# Patient Record
Sex: MALE | Race: WHITE | URBAN - METROPOLITAN AREA
[De-identification: names, ages, dates, MRNs, and addresses within clinical notes are randomized per-mention and may not be internally consistent; named-entity substitution may affect disease eponyms.]

---

## 2017-02-27 DIAGNOSIS — I10 ESSENTIAL HYPERTENSION: ICD-10-CM

## 2017-02-27 DIAGNOSIS — N02.8 IGA NEPHROPATHY: ICD-10-CM

## 2017-03-01 RX ORDER — LISINOPRIL 40 MG/1
TABLET ORAL
Qty: 90 TABLET | Refills: 0 | Status: SHIPPED | OUTPATIENT
Start: 2017-03-01 | End: 2017-05-15 | Stop reason: SDUPTHER

## 2017-03-24 ENCOUNTER — TELEPHONE (OUTPATIENT)
Dept: FAMILY MEDICINE CLINIC | Age: 52
End: 2017-03-24

## 2017-03-24 DIAGNOSIS — N02.8 IGA NEPHROPATHY: ICD-10-CM

## 2017-03-24 DIAGNOSIS — E78.00 HYPERCHOLESTEROLEMIA: ICD-10-CM

## 2017-03-24 DIAGNOSIS — I10 ESSENTIAL HYPERTENSION: ICD-10-CM

## 2017-03-24 DIAGNOSIS — R73.9 HYPERGLYCEMIA: Primary | ICD-10-CM

## 2017-04-21 ENCOUNTER — OFFICE VISIT (OUTPATIENT)
Dept: FAMILY MEDICINE CLINIC | Age: 52
End: 2017-04-21

## 2017-04-21 VITALS
SYSTOLIC BLOOD PRESSURE: 133 MMHG | TEMPERATURE: 98.3 F | HEART RATE: 57 BPM | BODY MASS INDEX: 34.04 KG/M2 | DIASTOLIC BLOOD PRESSURE: 83 MMHG | WEIGHT: 251 LBS | RESPIRATION RATE: 16 BRPM

## 2017-04-21 DIAGNOSIS — Z12.11 SCREEN FOR COLON CANCER: ICD-10-CM

## 2017-04-21 DIAGNOSIS — E78.00 HYPERCHOLESTEROLEMIA: ICD-10-CM

## 2017-04-21 DIAGNOSIS — L60.9 NAIL ABNORMALITIES: ICD-10-CM

## 2017-04-21 DIAGNOSIS — I10 ESSENTIAL HYPERTENSION: ICD-10-CM

## 2017-04-21 DIAGNOSIS — N02.8 IGA NEPHROPATHY: ICD-10-CM

## 2017-04-21 DIAGNOSIS — R73.9 HYPERGLYCEMIA: ICD-10-CM

## 2017-04-21 DIAGNOSIS — D22.9 MULTIPLE NEVI: ICD-10-CM

## 2017-04-21 DIAGNOSIS — Z00.00 ROUTINE GENERAL MEDICAL EXAMINATION AT A HEALTH CARE FACILITY: Primary | ICD-10-CM

## 2017-04-21 LAB
FOLATE: 15.3 NG/ML (ref 4.78–24.2)
T4 FREE: 1.3 NG/DL (ref 0.9–1.8)
TSH SERPL DL<=0.05 MIU/L-ACNC: 1.81 UIU/ML (ref 0.27–4.2)
VITAMIN B-12: 298 PG/ML (ref 211–911)
VITAMIN D 25-HYDROXY: 21.6 NG/ML

## 2017-04-21 PROCEDURE — 99396 PREV VISIT EST AGE 40-64: CPT | Performed by: FAMILY MEDICINE

## 2017-04-21 RX ORDER — CLOTRIMAZOLE AND BETAMETHASONE DIPROPIONATE 10; .64 MG/G; MG/G
CREAM TOPICAL
Qty: 45 G | Refills: 1 | Status: SHIPPED | OUTPATIENT
Start: 2017-04-21 | End: 2021-10-07

## 2017-05-12 ENCOUNTER — OFFICE VISIT (OUTPATIENT)
Dept: ORTHOPEDIC SURGERY | Age: 52
End: 2017-05-12

## 2017-05-12 ENCOUNTER — TELEPHONE (OUTPATIENT)
Dept: ORTHOPEDIC SURGERY | Age: 52
End: 2017-05-12

## 2017-05-12 VITALS
DIASTOLIC BLOOD PRESSURE: 82 MMHG | HEIGHT: 72 IN | BODY MASS INDEX: 34 KG/M2 | SYSTOLIC BLOOD PRESSURE: 127 MMHG | RESPIRATION RATE: 16 BRPM | HEART RATE: 53 BPM | WEIGHT: 251 LBS

## 2017-05-12 DIAGNOSIS — R20.0 HAND NUMBNESS: Primary | ICD-10-CM

## 2017-05-12 PROCEDURE — 99243 OFF/OP CNSLTJ NEW/EST LOW 30: CPT | Performed by: ORTHOPAEDIC SURGERY

## 2017-05-15 DIAGNOSIS — N02.8 IGA NEPHROPATHY: ICD-10-CM

## 2017-05-15 DIAGNOSIS — I10 ESSENTIAL HYPERTENSION: ICD-10-CM

## 2017-05-17 RX ORDER — LISINOPRIL 40 MG/1
TABLET ORAL
Qty: 90 TABLET | Refills: 1 | Status: SHIPPED | OUTPATIENT
Start: 2017-05-17 | End: 2017-12-08 | Stop reason: SDUPTHER

## 2017-08-16 ENCOUNTER — TELEPHONE (OUTPATIENT)
Dept: DERMATOLOGY | Age: 52
End: 2017-08-16

## 2017-08-16 ENCOUNTER — OFFICE VISIT (OUTPATIENT)
Dept: DERMATOLOGY | Age: 52
End: 2017-08-16

## 2017-08-16 DIAGNOSIS — B35.1 TINEA MANUUM, PEDIS, AND UNGUIUM: ICD-10-CM

## 2017-08-16 DIAGNOSIS — D22.9 MULTIPLE NEVI: Primary | ICD-10-CM

## 2017-08-16 DIAGNOSIS — B35.2 TINEA MANUUM, PEDIS, AND UNGUIUM: ICD-10-CM

## 2017-08-16 DIAGNOSIS — B35.3 TINEA MANUUM, PEDIS, AND UNGUIUM: ICD-10-CM

## 2017-08-16 PROCEDURE — 99202 OFFICE O/P NEW SF 15 MIN: CPT | Performed by: DERMATOLOGY

## 2017-08-16 RX ORDER — TERBINAFINE HYDROCHLORIDE 250 MG/1
250 TABLET ORAL DAILY
Qty: 42 TABLET | Refills: 0 | Status: SHIPPED | OUTPATIENT
Start: 2017-08-16 | End: 2017-09-27

## 2017-12-08 DIAGNOSIS — I10 ESSENTIAL HYPERTENSION: ICD-10-CM

## 2017-12-08 DIAGNOSIS — N02.8 IGA NEPHROPATHY: ICD-10-CM

## 2017-12-08 RX ORDER — LISINOPRIL 40 MG/1
TABLET ORAL
Qty: 90 TABLET | Refills: 0 | Status: SHIPPED | OUTPATIENT
Start: 2017-12-08 | End: 2018-01-18 | Stop reason: SDUPTHER

## 2018-01-04 ENCOUNTER — OFFICE VISIT (OUTPATIENT)
Dept: DERMATOLOGY | Age: 53
End: 2018-01-04

## 2018-01-04 DIAGNOSIS — B35.1 ONYCHOMYCOSIS: Primary | ICD-10-CM

## 2018-01-04 DIAGNOSIS — B35.2 TINEA MANUUM: ICD-10-CM

## 2018-01-04 PROCEDURE — 99212 OFFICE O/P EST SF 10 MIN: CPT | Performed by: DERMATOLOGY

## 2018-01-18 ENCOUNTER — OFFICE VISIT (OUTPATIENT)
Dept: FAMILY MEDICINE CLINIC | Age: 53
End: 2018-01-18

## 2018-01-18 VITALS
BODY MASS INDEX: 34.72 KG/M2 | OXYGEN SATURATION: 97 % | SYSTOLIC BLOOD PRESSURE: 128 MMHG | WEIGHT: 256 LBS | HEART RATE: 67 BPM | DIASTOLIC BLOOD PRESSURE: 80 MMHG

## 2018-01-18 DIAGNOSIS — Z00.00 ROUTINE GENERAL MEDICAL EXAMINATION AT A HEALTH CARE FACILITY: ICD-10-CM

## 2018-01-18 DIAGNOSIS — R73.9 HYPERGLYCEMIA: ICD-10-CM

## 2018-01-18 DIAGNOSIS — I10 ESSENTIAL HYPERTENSION: ICD-10-CM

## 2018-01-18 DIAGNOSIS — R07.89 CHEST PRESSURE: ICD-10-CM

## 2018-01-18 DIAGNOSIS — N02.8 IGA NEPHROPATHY: Primary | ICD-10-CM

## 2018-01-18 DIAGNOSIS — F43.9 STRESS: ICD-10-CM

## 2018-01-18 DIAGNOSIS — R15.9 INCONTINENCE OF FECES, UNSPECIFIED FECAL INCONTINENCE TYPE: ICD-10-CM

## 2018-01-18 DIAGNOSIS — Z12.11 SCREEN FOR COLON CANCER: ICD-10-CM

## 2018-01-18 DIAGNOSIS — E78.00 HYPERCHOLESTEROLEMIA: ICD-10-CM

## 2018-01-18 PROCEDURE — 93000 ELECTROCARDIOGRAM COMPLETE: CPT | Performed by: FAMILY MEDICINE

## 2018-01-18 PROCEDURE — 99214 OFFICE O/P EST MOD 30 MIN: CPT | Performed by: FAMILY MEDICINE

## 2018-01-18 RX ORDER — LISINOPRIL 40 MG/1
TABLET ORAL
Qty: 90 TABLET | Refills: 1 | Status: SHIPPED | OUTPATIENT
Start: 2018-01-18 | End: 2018-09-30 | Stop reason: SDUPTHER

## 2018-09-30 DIAGNOSIS — I10 ESSENTIAL HYPERTENSION: ICD-10-CM

## 2018-09-30 DIAGNOSIS — N02.8 IGA NEPHROPATHY: ICD-10-CM

## 2018-10-01 RX ORDER — LISINOPRIL 40 MG/1
TABLET ORAL
Qty: 90 TABLET | Refills: 0 | Status: SHIPPED | OUTPATIENT
Start: 2018-10-01 | End: 2019-01-08 | Stop reason: SDUPTHER

## 2019-01-08 DIAGNOSIS — N02.8 IGA NEPHROPATHY: ICD-10-CM

## 2019-01-08 DIAGNOSIS — I10 ESSENTIAL HYPERTENSION: ICD-10-CM

## 2019-01-08 RX ORDER — LISINOPRIL 40 MG/1
TABLET ORAL
Qty: 30 TABLET | Refills: 0 | Status: SHIPPED | OUTPATIENT
Start: 2019-01-08

## 2021-08-03 ENCOUNTER — OFFICE VISIT (OUTPATIENT)
Dept: FAMILY MEDICINE CLINIC | Age: 56
End: 2021-08-03
Payer: COMMERCIAL

## 2021-08-03 VITALS
WEIGHT: 253 LBS | SYSTOLIC BLOOD PRESSURE: 148 MMHG | OXYGEN SATURATION: 98 % | BODY MASS INDEX: 34.31 KG/M2 | HEART RATE: 63 BPM | TEMPERATURE: 97.7 F | RESPIRATION RATE: 20 BRPM | DIASTOLIC BLOOD PRESSURE: 96 MMHG

## 2021-08-03 DIAGNOSIS — E55.9 VITAMIN D DEFICIENCY: ICD-10-CM

## 2021-08-03 DIAGNOSIS — Z23 NEED FOR SHINGLES VACCINE: ICD-10-CM

## 2021-08-03 DIAGNOSIS — E78.00 HYPERCHOLESTEROLEMIA: ICD-10-CM

## 2021-08-03 DIAGNOSIS — I10 ESSENTIAL HYPERTENSION: ICD-10-CM

## 2021-08-03 DIAGNOSIS — Z23 NEED FOR DIPHTHERIA-TETANUS-PERTUSSIS (TDAP) VACCINE: ICD-10-CM

## 2021-08-03 DIAGNOSIS — Z12.2 ENCOUNTER FOR SCREENING FOR LUNG CANCER: ICD-10-CM

## 2021-08-03 DIAGNOSIS — N02.8 IGA NEPHROPATHY: ICD-10-CM

## 2021-08-03 DIAGNOSIS — J34.89 NASAL VESTIBULITIS: ICD-10-CM

## 2021-08-03 DIAGNOSIS — R73.9 HYPERGLYCEMIA: Primary | ICD-10-CM

## 2021-08-03 DIAGNOSIS — R10.84 GENERALIZED ABDOMINAL PAIN: ICD-10-CM

## 2021-08-03 DIAGNOSIS — Z12.11 SCREEN FOR COLON CANCER: ICD-10-CM

## 2021-08-03 DIAGNOSIS — Z12.5 SCREENING FOR PROSTATE CANCER: ICD-10-CM

## 2021-08-03 PROCEDURE — 90472 IMMUNIZATION ADMIN EACH ADD: CPT | Performed by: FAMILY MEDICINE

## 2021-08-03 PROCEDURE — 99214 OFFICE O/P EST MOD 30 MIN: CPT | Performed by: FAMILY MEDICINE

## 2021-08-03 PROCEDURE — 90471 IMMUNIZATION ADMIN: CPT | Performed by: FAMILY MEDICINE

## 2021-08-03 PROCEDURE — 90750 HZV VACC RECOMBINANT IM: CPT | Performed by: FAMILY MEDICINE

## 2021-08-03 PROCEDURE — 90715 TDAP VACCINE 7 YRS/> IM: CPT | Performed by: FAMILY MEDICINE

## 2021-08-03 RX ORDER — LISINOPRIL 40 MG/1
40 TABLET ORAL DAILY
COMMUNITY
Start: 2021-06-15

## 2021-08-03 SDOH — ECONOMIC STABILITY: FOOD INSECURITY: WITHIN THE PAST 12 MONTHS, THE FOOD YOU BOUGHT JUST DIDN'T LAST AND YOU DIDN'T HAVE MONEY TO GET MORE.: NEVER TRUE

## 2021-08-03 SDOH — ECONOMIC STABILITY: FOOD INSECURITY: WITHIN THE PAST 12 MONTHS, YOU WORRIED THAT YOUR FOOD WOULD RUN OUT BEFORE YOU GOT MONEY TO BUY MORE.: NEVER TRUE

## 2021-08-03 ASSESSMENT — PATIENT HEALTH QUESTIONNAIRE - PHQ9
SUM OF ALL RESPONSES TO PHQ QUESTIONS 1-9: 0
SUM OF ALL RESPONSES TO PHQ QUESTIONS 1-9: 0
2. FEELING DOWN, DEPRESSED OR HOPELESS: 0
SUM OF ALL RESPONSES TO PHQ9 QUESTIONS 1 & 2: 0
1. LITTLE INTEREST OR PLEASURE IN DOING THINGS: 0
SUM OF ALL RESPONSES TO PHQ QUESTIONS 1-9: 0

## 2021-08-03 ASSESSMENT — SOCIAL DETERMINANTS OF HEALTH (SDOH): HOW HARD IS IT FOR YOU TO PAY FOR THE VERY BASICS LIKE FOOD, HOUSING, MEDICAL CARE, AND HEATING?: NOT HARD AT ALL

## 2021-08-03 NOTE — PROGRESS NOTES
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 Jon Christiansen for his kidney issues, although they do not love his blood pressure. Pt did have imaging that showed a cyst on his kidney but wasn't concerned. Pt currently taking lisinopril 40mg qd, and they did give him amlodipine but he did not tolerate. Pt has been havingsome issues with some GI issues. Pt had loose bowels, not true diarrhea. The sx were present for the past few weeks but have since subsided. At this time, no sx. Pt is due for f/u colonoscopy and does want to get that set up. No n/v, no issues of current abd pain. Pt does not have any sx that would go along with diabetes mellitus. No excessive thirst.  No weight change. Pt did have elevation of blood sugars. No exercise issues, no exertional chest pain, shortness of breath. No abd pain, no urinary sx. No issues related to COVID    Pt did smoke in the past, and did have some smoking for about 7 yrs +, and did have secondary exposure with work. Pt smoked 1 PPD      Except as noted above in the history of present illness, the review of systems is  negative for headache, vision changes, chest pain, shortness of breath, abdominal pain, urinary sx, bowel changes. Past medical, surgical, and social history reviewed and updated  Medications and allergies reviewed and updated        O: BP (!) 148/96   Pulse 63   Temp 97.7 °F (36.5 °C)   Resp 20   Wt 253 lb (114.8 kg)   SpO2 98%   BMI 34.31 kg/m²   GEN: No acute distress, cooperative, well nourished, alert. HEENT: PEERLA, EOMI , normocephalic/atraumatic, nares and oropharynx clear. Mucous membranes normal, Tympanic membranes clear bilaterally. Neck: soft, supple, no thyromegaly, mass, no Lymphadenopathy  CV: Regular rate and rhythm, no murmur, rubs, gallops. No edema. Resp: Clear to auscultation bilaterally good air entry bilaterally  No crackles, wheeze. Breathing comfortably. Psych: mood stable, No suicidal thoughts or ideation         ASSESSMENT / PLAN:    1. Hyperglycemia  Overdue bloodwork  No sx to suggest diabetes mellitus, but last bloodwork over 3 yrs ago  Check a1c  - Hemoglobin A1C; Future    2. Essential hypertension  Recheck high, has not been consistent with taking meds in past 24 hrs  Resume lisinopril 40mg qd  F/u with nephrology  Check bloodwork as below  - CBC; Future  - Comprehensive Metabolic Panel; Future  - Lipid Panel; Future    3. Hypercholesterolemia  Check cmp, lipids  Discussed CT calcium score, will await CT lung screening results    4. IgA nephropathy  Cont f/u with nephrology    5. Vitamin D deficiency  - Vitamin D 25 Hydroxy; Future    6. Screen for colon cancer  Due f/u colonoscopy  Refer dr. Farzaneh Neumann MD, Gastroenterology, [facility]    7. Encounter for screening for lung cancer  Has < 20 pk year hx, but wants screening  Aware likely will have pt pay out of pocket for testing, will have pt set up @ [facility]  - CT Lung Screen (Initial or Annual); Future    8. Need for shingles vaccine  Pt is due for update on shingles vaccine. Pt is given information on Shingrix vaccine, need for 2 doses spaced 2-6 months apart, and that due to medicare requirements, they may need to get vaccine at pharmacy. Pt given information/prescription if appropriate. Limited stock availability also discussed. Given # 1 today    9. Need for diphtheria-tetanus-pertussis (Tdap) vaccine  Given today    10. Generalized abdominal pain  Resolved  Check bloodwork  Needs f/u colonoscopy  Refer dr. Chichi Bryson for eval  - Luis Wallace MD, Gastroenterology, [facility]  - Amylase; Future  - Lipase; Future    11. Screening for prostate cancer  - PSA, Prostatic Specific Antigen; Future    12.  Nasal vestibulitis  Cont prn mupirocin            Follow-up appointment:   Pending bloodwork results  Pending CT scan results    Discussed use, benefit, and side effects of all prescribed medications. Barriers to medication compliance addressed. All patient questions answered. Pt voiced understanding. When applicable, patient's outside records were reviewed through [facility]. The patient has signed appropriate paperworks/consents.

## 2021-08-31 ENCOUNTER — TELEPHONE (OUTPATIENT)
Dept: DERMATOLOGY | Age: 56
End: 2021-08-31

## 2021-08-31 NOTE — TELEPHONE ENCOUNTER
Lesion on ear, bleeding, scabbing, not healing for 3 weeks. A couple \"new moles\" on arm and back. Can he have a spot check please. He is worried mostly about his ear because it bleeds.      [phone]

## 2021-09-01 NOTE — TELEPHONE ENCOUNTER
Informed patient that Dr Baldev Bashir currently doesn't have any upcoming openings as of now. However I let patient know that I will contact them when we get a cancellation. Patient verbalized understanding.

## 2021-09-07 ENCOUNTER — TELEPHONE (OUTPATIENT)
Dept: DERMATOLOGY | Age: 56
End: 2021-09-07

## 2021-10-07 ENCOUNTER — OFFICE VISIT (OUTPATIENT)
Dept: DERMATOLOGY | Age: 56
End: 2021-10-07
Payer: COMMERCIAL

## 2021-10-07 VITALS — TEMPERATURE: 98.5 F

## 2021-10-07 DIAGNOSIS — L57.8 DIFFUSE PHOTODAMAGE OF SKIN: ICD-10-CM

## 2021-10-07 DIAGNOSIS — D22.9 MULTIPLE NEVI: Primary | ICD-10-CM

## 2021-10-07 DIAGNOSIS — L83 ACANTHOSIS NIGRICANS: ICD-10-CM

## 2021-10-07 PROCEDURE — 99213 OFFICE O/P EST LOW 20 MIN: CPT | Performed by: DERMATOLOGY

## 2021-10-07 NOTE — PROGRESS NOTES
[facility]  Zackary Weeks MD  [phone]      [facility]  1965    49 y.o. male     Date of Visit: 10/7/2021    Chief Complaint: skin lesions    History of Present Illness:    1. He presents today for evaluation of multiple moles on the trunk and extremities-not aware of any changes in size, color, or shape. 2.  Also complains of gradual onset brownish discoloration on the sides of the neck and forearms. 3.  He also complains of some discoloration in the axillae that is asymptomatic. He does report a family history of diabetes. He had a lesion on the left earlobe that has since resolved. It was present for about 3 to 4 weeks. Review of Systems:  Gen: Feels well, good sense of health. Past Medical History, Family History, Surgical History, Medications and Allergies reviewed. Past Medical History:   Diagnosis Date    Eczema     Essential hypertension     Hypercholesterolemia 2/29/2016    Hyperglycemia     IgA nephropathy     Vitamin D deficiency      Past Surgical History:   Procedure Laterality Date    KIDNEY BIOPSY  1995    URETEROSCOPY         Allergies   Allergen Reactions    Seasonal      Hay fever ,    Zocor [Simvastatin] Other (See Comments)     Myalgias severe     Outpatient Medications Marked as Taking for the 10/7/21 encounter (Office Visit) with Ezequiel Jacob MD   Medication Sig Dispense Refill    mupirocin (BACTROBAN) 2 % nasal ointment by Nasal route 2 times daily 1 Tube 5    lisinopril (PRINIVIL;ZESTRIL) 40 MG tablet Take 40 mg by mouth daily      lisinopril (PRINIVIL;ZESTRIL) 40 MG tablet TAKE 1 TABLET BY MOUTH EVERY DAY 30 tablet 0    aspirin 81 MG tablet Take 81 mg by mouth daily           Physical Examination       The following were examined and determined to be normal: Psych/Neuro, Scalp/hair, Head/face, Conjunctivae/eyelids, Gums/teeth/lips, Breast/axilla/chest, Abdomen, Back, RLE, LLE and Nails/digits.     The following were examined and determined to be abnormal: Neck, RUE and LUE. Well appearing. 1.  Trunk and extremities with multiple well defined round to oval smooth brown macules and papules. 2.  Lateral portions of the neck and forearms with multiple well-defined smooth brown patches. 3.  Axilla with velvety pink-brown skin. Assessment and Plan     1. Multiple nevi - benign appearing    Sun protective behaviors, including use of at least SPF 30 sunscreen, and self skin examinations were encouraged. Call for any new or concerning lesions. 2. Diffuse photodamage of skin     Education and reassurance. 3. Acanthosis nigricans     Discussed association with diabetes. Encouraged weight loss and monitoring for diabetes.           --Dago Gage MD

## 2022-05-09 LAB
ALBUMIN SERPL-MCNC: 4.4 G/DL (ref 3.4–5)
ANION GAP SERPL CALCULATED.3IONS-SCNC: 14 MMOL/L (ref 3–16)
BACTERIA: NORMAL /HPF
BASOPHILS ABSOLUTE: 0 K/UL (ref 0–0.2)
BASOPHILS RELATIVE PERCENT: 0.6 %
BILIRUBIN URINE: NEGATIVE
BLOOD, URINE: NEGATIVE
BUN BLDV-MCNC: 17 MG/DL (ref 7–20)
CALCIUM SERPL-MCNC: 9.4 MG/DL (ref 8.3–10.6)
CHLORIDE BLD-SCNC: 104 MMOL/L (ref 99–110)
CLARITY: CLEAR
CO2: 21 MMOL/L (ref 21–32)
COLOR: YELLOW
CREAT SERPL-MCNC: 1.2 MG/DL (ref 0.9–1.3)
CREATININE URINE: 160.1 MG/DL (ref 39–259)
EOSINOPHILS ABSOLUTE: 0.1 K/UL (ref 0–0.6)
EOSINOPHILS RELATIVE PERCENT: 1 %
EPITHELIAL CELLS, UA: 0 /HPF (ref 0–5)
GFR AFRICAN AMERICAN: >60
GFR NON-AFRICAN AMERICAN: >60
GLUCOSE BLD-MCNC: 113 MG/DL (ref 70–99)
GLUCOSE URINE: NEGATIVE MG/DL
HCT VFR BLD CALC: 42.6 % (ref 40.5–52.5)
HEMOGLOBIN: 14.5 G/DL (ref 13.5–17.5)
HYALINE CASTS: 0 /LPF (ref 0–8)
KETONES, URINE: NEGATIVE MG/DL
LEUKOCYTE ESTERASE, URINE: NEGATIVE
LYMPHOCYTES ABSOLUTE: 1.7 K/UL (ref 1–5.1)
LYMPHOCYTES RELATIVE PERCENT: 28.9 %
MCH RBC QN AUTO: 30.7 PG (ref 26–34)
MCHC RBC AUTO-ENTMCNC: 34 G/DL (ref 31–36)
MCV RBC AUTO: 90.3 FL (ref 80–100)
MICROSCOPIC EXAMINATION: YES
MONOCYTES ABSOLUTE: 0.5 K/UL (ref 0–1.3)
MONOCYTES RELATIVE PERCENT: 8.9 %
NEUTROPHILS ABSOLUTE: 3.7 K/UL (ref 1.7–7.7)
NEUTROPHILS RELATIVE PERCENT: 60.6 %
NITRITE, URINE: NEGATIVE
PDW BLD-RTO: 13.5 % (ref 12.4–15.4)
PH UA: 5.5 (ref 5–8)
PHOSPHORUS: 4 MG/DL (ref 2.5–4.9)
PLATELET # BLD: 284 K/UL (ref 135–450)
PMV BLD AUTO: 8.9 FL (ref 5–10.5)
POTASSIUM SERPL-SCNC: 4.4 MMOL/L (ref 3.5–5.1)
PROTEIN PROTEIN: 36 MG/DL
PROTEIN UA: 30 MG/DL
PROTEIN/CREAT RATIO: 0.2 MG/DL
RBC # BLD: 4.72 M/UL (ref 4.2–5.9)
RBC UA: 2 /HPF (ref 0–4)
SODIUM BLD-SCNC: 139 MMOL/L (ref 136–145)
SPECIFIC GRAVITY UA: 1.02 (ref 1–1.03)
URINE TYPE: ABNORMAL
UROBILINOGEN, URINE: 0.2 E.U./DL
WBC # BLD: 6 K/UL (ref 4–11)
WBC UA: 0 /HPF (ref 0–5)

## 2023-04-04 ENCOUNTER — TELEPHONE (OUTPATIENT)
Dept: FAMILY MEDICINE CLINIC | Age: 58
End: 2023-04-04

## 2023-04-04 DIAGNOSIS — Z00.00 ROUTINE GENERAL MEDICAL EXAMINATION AT A HEALTH CARE FACILITY: Primary | ICD-10-CM

## 2023-04-04 NOTE — TELEPHONE ENCOUNTER
Pt called wanting to schedule an appt for increased bp x 3 months, pt states today it has been 144/89 at 9 am, 168/86 at 9:24 am, 151/98 at 3 pm and 159/102 5 min later. pt has IGA nephropathy and prescribed Lisinopril 40 mg that he take daily at night. Pt states he has a laundry list to discuss- Pre diabetes, ringing in ears x 8 months, right shoulder pain,sciatica. ....  Physical scheduled for 4/7/23 at 8 am however pt would like to know if pcp would like to order labs prior to appt labs pending approval.   Pt last ov was 8/3/2021

## 2023-04-05 ENCOUNTER — TELEPHONE (OUTPATIENT)
Dept: FAMILY MEDICINE CLINIC | Age: 58
End: 2023-04-05

## 2023-04-05 DIAGNOSIS — N02.8 IGA NEPHROPATHY: ICD-10-CM

## 2023-04-05 DIAGNOSIS — Z00.00 ROUTINE GENERAL MEDICAL EXAMINATION AT A HEALTH CARE FACILITY: Primary | ICD-10-CM

## 2023-04-05 DIAGNOSIS — Z00.00 ROUTINE GENERAL MEDICAL EXAMINATION AT A HEALTH CARE FACILITY: ICD-10-CM

## 2023-04-05 LAB
ALBUMIN SERPL-MCNC: 4.5 G/DL (ref 3.4–5)
ALBUMIN/GLOB SERPL: 2 {RATIO} (ref 1.1–2.2)
ALP SERPL-CCNC: 50 U/L (ref 40–129)
ALT SERPL-CCNC: 25 U/L (ref 10–40)
ANION GAP SERPL CALCULATED.3IONS-SCNC: 13 MMOL/L (ref 3–16)
AST SERPL-CCNC: 21 U/L (ref 15–37)
BACTERIA URNS QL MICRO: NORMAL /HPF
BILIRUB SERPL-MCNC: 0.6 MG/DL (ref 0–1)
BILIRUB UR QL STRIP.AUTO: NEGATIVE
BUN SERPL-MCNC: 13 MG/DL (ref 7–20)
CALCIUM SERPL-MCNC: 9.4 MG/DL (ref 8.3–10.6)
CHLORIDE SERPL-SCNC: 99 MMOL/L (ref 99–110)
CHOLEST SERPL-MCNC: 215 MG/DL (ref 0–199)
CLARITY UR: CLEAR
CO2 SERPL-SCNC: 23 MMOL/L (ref 21–32)
COLOR UR: YELLOW
CREAT SERPL-MCNC: 0.9 MG/DL (ref 0.9–1.3)
CREAT UR-MCNC: 19.8 MG/DL (ref 39–259)
DEPRECATED RDW RBC AUTO: 13.3 % (ref 12.4–15.4)
EPI CELLS #/AREA URNS AUTO: 0 /HPF (ref 0–5)
GFR SERPLBLD CREATININE-BSD FMLA CKD-EPI: >60 ML/MIN/{1.73_M2}
GLUCOSE SERPL-MCNC: 94 MG/DL (ref 70–99)
GLUCOSE UR STRIP.AUTO-MCNC: NEGATIVE MG/DL
HCT VFR BLD AUTO: 44.6 % (ref 40.5–52.5)
HDLC SERPL-MCNC: 36 MG/DL (ref 40–60)
HGB BLD-MCNC: 14.9 G/DL (ref 13.5–17.5)
HGB UR QL STRIP.AUTO: NEGATIVE
HYALINE CASTS #/AREA URNS AUTO: 0 /LPF (ref 0–8)
KETONES UR STRIP.AUTO-MCNC: NEGATIVE MG/DL
LDLC SERPL CALC-MCNC: 162 MG/DL
LEUKOCYTE ESTERASE UR QL STRIP.AUTO: ABNORMAL
MCH RBC QN AUTO: 30.8 PG (ref 26–34)
MCHC RBC AUTO-ENTMCNC: 33.5 G/DL (ref 31–36)
MCV RBC AUTO: 91.9 FL (ref 80–100)
MICROALBUMIN UR DL<=1MG/L-MCNC: 3.2 MG/DL
MICROALBUMIN/CREAT UR: 161.6 MG/G (ref 0–30)
NITRITE UR QL STRIP.AUTO: NEGATIVE
PH UR STRIP.AUTO: 6.5 [PH] (ref 5–8)
PLATELET # BLD AUTO: 287 K/UL (ref 135–450)
PMV BLD AUTO: 9.2 FL (ref 5–10.5)
POTASSIUM SERPL-SCNC: 4.5 MMOL/L (ref 3.5–5.1)
PROT SERPL-MCNC: 6.7 G/DL (ref 6.4–8.2)
PROT UR STRIP.AUTO-MCNC: NEGATIVE MG/DL
PSA SERPL DL<=0.01 NG/ML-MCNC: 0.6 NG/ML (ref 0–4)
RBC # BLD AUTO: 4.85 M/UL (ref 4.2–5.9)
RBC CLUMPS #/AREA URNS AUTO: 0 /HPF (ref 0–4)
SODIUM SERPL-SCNC: 135 MMOL/L (ref 136–145)
SP GR UR STRIP.AUTO: 1 (ref 1–1.03)
TRIGL SERPL-MCNC: 86 MG/DL (ref 0–150)
TSH SERPL DL<=0.005 MIU/L-ACNC: 1.95 UIU/ML (ref 0.27–4.2)
UA DIPSTICK W REFLEX MICRO PNL UR: YES
URN SPEC COLLECT METH UR: ABNORMAL
UROBILINOGEN UR STRIP-ACNC: 0.2 E.U./DL
VLDLC SERPL CALC-MCNC: 17 MG/DL
WBC # BLD AUTO: 4.9 K/UL (ref 4–11)
WBC #/AREA URNS AUTO: 2 /HPF (ref 0–5)

## 2023-04-06 LAB
EST. AVERAGE GLUCOSE BLD GHB EST-MCNC: 111.2 MG/DL
HBA1C MFR BLD: 5.5 %

## 2023-04-07 ENCOUNTER — OFFICE VISIT (OUTPATIENT)
Dept: FAMILY MEDICINE CLINIC | Age: 58
End: 2023-04-07

## 2023-04-07 VITALS
SYSTOLIC BLOOD PRESSURE: 152 MMHG | WEIGHT: 252.2 LBS | BODY MASS INDEX: 34.16 KG/M2 | HEIGHT: 72 IN | DIASTOLIC BLOOD PRESSURE: 92 MMHG | HEART RATE: 64 BPM | RESPIRATION RATE: 12 BRPM | OXYGEN SATURATION: 97 % | TEMPERATURE: 98 F

## 2023-04-07 DIAGNOSIS — R53.83 FATIGUE, UNSPECIFIED TYPE: ICD-10-CM

## 2023-04-07 DIAGNOSIS — R06.83 SNORING: ICD-10-CM

## 2023-04-07 DIAGNOSIS — I10 ESSENTIAL HYPERTENSION: ICD-10-CM

## 2023-04-07 DIAGNOSIS — M25.511 CHRONIC RIGHT SHOULDER PAIN: ICD-10-CM

## 2023-04-07 DIAGNOSIS — G89.29 CHRONIC RIGHT SHOULDER PAIN: ICD-10-CM

## 2023-04-07 DIAGNOSIS — E78.00 HYPERCHOLESTEROLEMIA: ICD-10-CM

## 2023-04-07 DIAGNOSIS — Z13.6 SCREENING, ISCHEMIC HEART DISEASE: ICD-10-CM

## 2023-04-07 DIAGNOSIS — E66.9 CLASS 1 OBESITY WITHOUT SERIOUS COMORBIDITY WITH BODY MASS INDEX (BMI) OF 34.0 TO 34.9 IN ADULT, UNSPECIFIED OBESITY TYPE: ICD-10-CM

## 2023-04-07 DIAGNOSIS — N02.8 IGA NEPHROPATHY: ICD-10-CM

## 2023-04-07 DIAGNOSIS — Z00.00 ROUTINE GENERAL MEDICAL EXAMINATION AT A HEALTH CARE FACILITY: Primary | ICD-10-CM

## 2023-04-07 DIAGNOSIS — R73.9 HYPERGLYCEMIA: ICD-10-CM

## 2023-04-07 RX ORDER — AMLODIPINE BESYLATE 5 MG/1
5 TABLET ORAL DAILY
Qty: 30 TABLET | Refills: 5 | Status: SHIPPED | OUTPATIENT
Start: 2023-04-07

## 2023-04-07 RX ORDER — ROSUVASTATIN CALCIUM 10 MG/1
10 TABLET, COATED ORAL NIGHTLY
Qty: 30 TABLET | Refills: 5 | Status: SHIPPED | OUTPATIENT
Start: 2023-04-07

## 2023-04-07 SDOH — ECONOMIC STABILITY: INCOME INSECURITY: HOW HARD IS IT FOR YOU TO PAY FOR THE VERY BASICS LIKE FOOD, HOUSING, MEDICAL CARE, AND HEATING?: NOT HARD AT ALL

## 2023-04-07 SDOH — ECONOMIC STABILITY: HOUSING INSECURITY
IN THE LAST 12 MONTHS, WAS THERE A TIME WHEN YOU DID NOT HAVE A STEADY PLACE TO SLEEP OR SLEPT IN A SHELTER (INCLUDING NOW)?: NO

## 2023-04-07 SDOH — ECONOMIC STABILITY: FOOD INSECURITY: WITHIN THE PAST 12 MONTHS, YOU WORRIED THAT YOUR FOOD WOULD RUN OUT BEFORE YOU GOT MONEY TO BUY MORE.: NEVER TRUE

## 2023-04-07 SDOH — ECONOMIC STABILITY: FOOD INSECURITY: WITHIN THE PAST 12 MONTHS, THE FOOD YOU BOUGHT JUST DIDN'T LAST AND YOU DIDN'T HAVE MONEY TO GET MORE.: NEVER TRUE

## 2023-04-07 ASSESSMENT — PATIENT HEALTH QUESTIONNAIRE - PHQ9
SUM OF ALL RESPONSES TO PHQ9 QUESTIONS 1 & 2: 0
SUM OF ALL RESPONSES TO PHQ QUESTIONS 1-9: 0
SUM OF ALL RESPONSES TO PHQ QUESTIONS 1-9: 0
1. LITTLE INTEREST OR PLEASURE IN DOING THINGS: 0
SUM OF ALL RESPONSES TO PHQ QUESTIONS 1-9: 0
SUM OF ALL RESPONSES TO PHQ QUESTIONS 1-9: 0
2. FEELING DOWN, DEPRESSED OR HOPELESS: 0

## 2023-04-07 NOTE — PROGRESS NOTES
pressure  Check testosterone level  Management pending results. Consider sleep study  - Testosterone; Future    3. Screening, ischemic heart disease  - CT CARDIAC CALCIUM SCORING; Future    4. Hyperglycemia  A1c improved  Cont lifestyle mgt    5. Hypercholesterolemia  Persistent high lipids  Intol zocor  Check CT calcium score  Trial crestor 10mg qd  Discussed use, benefit, and side effects of prescribed medications. Barriers to medication compliance addressed. All patient questions answered. Pt voiced understanding. 6. Essential hypertension  Elevated  Recheck still high  Add amlodipine 5mg qd  Discussed use, benefit, and side effects of prescribed medications. Barriers to medication compliance addressed. All patient questions answered. Pt voiced understanding. 7. IgA nephropathy  Creatinine normal, cont ACE  Mild increase urine micro/cr ratio  Treat blood pressure as above    8. Chronic right shoulder pain  Refer ortho for eval  - Rena Singh MD, Orthopedics and Sports Medicine (Shoulder; Elbow), [facility]    9. Snoring  Concern for SUNDAY  Discussed tx options. Deferring sleep eval at this time, consider sleep study  Monitor with attempts at weight loss, diet/exercise  Aware risk of untreated obstructive sleep apnea    10. Class 1 obesity without serious comorbidity with body mass index (BMI) of 34.0 to 34.9 in adult, unspecified obesity type  As above           Follow-up appointment:   Pending testosterone level  4-6 wks  Pending CT can    Discussed use, benefit, and side effects of all prescribed medications. Barriers to medication compliance addressed. All patient questions answered. Pt voiced understanding. When applicable, patient's outside records were reviewed through [facility]. The patient has signed appropriate paperworks/consents.

## 2023-04-11 LAB
SHBG SERPL-SCNC: 44 NMOL/L (ref 11–80)
TESTOST FREE SERPL-MCNC: 86.6 PG/ML (ref 47–244)
TESTOST SERPL-MCNC: 496 NG/DL (ref 220–1000)

## 2023-04-25 ENCOUNTER — OFFICE VISIT (OUTPATIENT)
Dept: ORTHOPEDIC SURGERY | Age: 58
End: 2023-04-25
Payer: COMMERCIAL

## 2023-04-25 VITALS — BODY MASS INDEX: 33.46 KG/M2 | HEIGHT: 72 IN | WEIGHT: 247 LBS

## 2023-04-25 DIAGNOSIS — M67.911 ROTATOR CUFF DYSFUNCTION, RIGHT: ICD-10-CM

## 2023-04-25 DIAGNOSIS — M25.511 RIGHT SHOULDER PAIN, UNSPECIFIED CHRONICITY: Primary | ICD-10-CM

## 2023-04-25 PROCEDURE — 99203 OFFICE O/P NEW LOW 30 MIN: CPT | Performed by: ORTHOPAEDIC SURGERY

## 2023-04-25 RX ORDER — MELOXICAM 15 MG/1
15 TABLET ORAL DAILY PRN
Qty: 30 TABLET | Refills: 3 | Status: SHIPPED | OUTPATIENT
Start: 2023-04-25

## 2023-04-25 NOTE — PROGRESS NOTES
Chief Complaint    New Patient (Right Shoulder Eval)      History of Present Illness:  Mak Dumont is a pleasant, RHD 56 y.o. male who was referred to us today by Dr. Aaron Argueta for consultation and treatment of his ongoing right shoulder pain. This patient reports pain for 30+ years when he recalls throwing baseball with his son all day and has had persistent pain in the shoulder since then. He states there was no inciting trauma to the shoulder. He especially complains of worsening pain over the course of the past 3 years. Today he complains of pain as well as weakness of the shoulder. He has pain at night which can disrupt his sleep. He does enjoy fishing and frisbee golf and has pain with these activities. He has done some home exercises and modified his activities which have not provided relief to his shoulder. He denies neurological symptoms. He occasionally takes an anti-inflammatory before physical activity. He does complain of mechanical clicking in the shoulder with overhead activities. Medical History:      Patient's medications, allergies, past medical, surgical, social and family histories were reviewed and updated as appropriate.     Past Medical History:   Diagnosis Date    Eczema     Essential hypertension     Hypercholesterolemia 2016    Hyperglycemia     IgA nephropathy     Vitamin D deficiency       Social History     Socioeconomic History    Marital status:      Spouse name: s    Number of children: Not on file    Years of education: Not on file    Highest education level: Not on file   Occupational History    Occupation:      Employer: SELF EMPLOYED   Tobacco Use    Smoking status: Former     Packs/day: 1.00     Years: 7.00     Pack years: 7.00     Types: Cigarettes     Start date: 1989     Quit date: 1996     Years since quittin.1    Smokeless tobacco: Never   Vaping Use    Vaping Use: Former   Substance and Sexual Activity    Alcohol use:

## 2023-05-25 ENCOUNTER — OFFICE VISIT (OUTPATIENT)
Dept: ORTHOPEDIC SURGERY | Age: 58
End: 2023-05-25
Payer: COMMERCIAL

## 2023-05-25 VITALS — WEIGHT: 247 LBS | BODY MASS INDEX: 33.46 KG/M2 | HEIGHT: 72 IN

## 2023-05-25 DIAGNOSIS — M75.21 BICEPS TENDINITIS OF RIGHT UPPER EXTREMITY: ICD-10-CM

## 2023-05-25 DIAGNOSIS — M75.121 NONTRAUMATIC COMPLETE TEAR OF RIGHT ROTATOR CUFF: Primary | ICD-10-CM

## 2023-05-25 DIAGNOSIS — M25.511 RIGHT SHOULDER PAIN, UNSPECIFIED CHRONICITY: ICD-10-CM

## 2023-05-25 PROCEDURE — 99214 OFFICE O/P EST MOD 30 MIN: CPT | Performed by: ORTHOPAEDIC SURGERY

## 2023-05-25 NOTE — PROGRESS NOTES
[facility]  History and Physical  Shoulder Pain    Date:  2023    Name:  Jocelyn Sharma  Address:  [place] [address]    :  1965      Age:   56 y.o.    SSN:  [id number]      Medical Record Number:  [id number]    Reason for Visit:    Shoulder Pain (F/U RIGHT SHOULDER)      HPI:   Jocelyn Sharma is a 56 y.o. male who presents to our office today for follow up of the right shoulder pain. There was concern that he had a rotator cuff tear and he was able to get his MRI completed and would like to review the results. He continues to have discomfort with activities. He denies any new injuries. HPI 2023  Referred to us today by Dr. Tatum Duvall for consultation and treatment of his ongoing right shoulder pain. This patient reports pain for 30+ years when he recalls throwing baseball with his son all day and has had persistent pain in the shoulder since then. He states there was no inciting trauma to the shoulder. He especially complains of worsening pain over the course of the past 3 years. Today he complains of pain as well as weakness of the shoulder. He has pain at night which can disrupt his sleep. He does enjoy fishing and frisbee golf and has pain with these activities. He has done some home exercises and modified his activities which have not provided relief to his shoulder. He denies neurological symptoms. He occasionally takes an anti-inflammatory before physical activity. He does complain of mechanical clicking in the shoulder with overhead activities.      Pain Assessment  Location of Pain: Shoulder  Location Modifiers: Right  Severity of Pain: 1  Quality of Pain: Dull  Duration of Pain: Persistent  Frequency of Pain: Intermittent  Aggravating Factors:  (certain movements)  Limiting Behavior: Some  Relieving Factors: Rest, Ice, Heat  Work-Related Injury: No  Are there other pain locations you wish to document?: No    No

## 2023-07-21 NOTE — TELEPHONE ENCOUNTER
Requested Prescriptions     Pending Prescriptions Disp Refills    lisinopril (PRINIVIL;ZESTRIL) 40 MG tablet [Pharmacy Med Name: LISINOPRIL 40MG TABLETS] 90 tablet 1     Sig: TAKE 1 TABLET BY MOUTH DAILY          Last Office Visit: 4/7/2023     Next Office Visit: Visit date not found     Last Labs: 4/5/23

## 2023-07-22 RX ORDER — LISINOPRIL 40 MG/1
40 TABLET ORAL DAILY
Qty: 90 TABLET | Refills: 1 | Status: SHIPPED | OUTPATIENT
Start: 2023-07-22

## 2023-10-11 ENCOUNTER — OFFICE VISIT (OUTPATIENT)
Dept: ORTHOPEDIC SURGERY | Age: 58
End: 2023-10-11

## 2023-10-11 VITALS — BODY MASS INDEX: 33.46 KG/M2 | WEIGHT: 247 LBS | HEIGHT: 72 IN

## 2023-10-11 DIAGNOSIS — M75.121 COMPLETE TEAR OF RIGHT ROTATOR CUFF, UNSPECIFIED WHETHER TRAUMATIC: ICD-10-CM

## 2023-10-11 DIAGNOSIS — M75.21 BICEPS TENDINITIS OF RIGHT UPPER EXTREMITY: Primary | ICD-10-CM

## 2023-10-11 NOTE — PROGRESS NOTES
[address]  History and Physical  Shoulder Pain    Date:  10/11/2023    Name:  Jarrett Lennox  [facility]    :  1965      Age:   66 y.o.    SSN:  [id number]      Medical Record Number:  [id number]    Reason for Visit:    Shoulder Pain (F/U RIGHT SHOULDER)      HPI:   Jarrett Lennox is a 66 y.o. male who presents to our office today for follow up of the right shoulder pain. Patient had a prior MRI from May which showed rotator cuff tear of the supraspinatus with interstitial tearing of the infraspinatus and subscapularis. MRI also demonstrated bicipital tendinitis and SLAP tear. Surgery was discussed at that time however it did not work well with his current work schedule and social life. He is here today to schedule surgery based off his prior diagnosis and continued symptoms. Pain Assessment  Location of Pain: Shoulder  Location Modifiers: Right  Severity of Pain: 2  Quality of Pain: Dull, Aching, Sharp, Throbbing  Duration of Pain: Persistent  Frequency of Pain: Constant  Aggravating Factors: Stretching, Straightening, Exercise, Other (Comment)  Limiting Behavior: Yes  Relieving Factors: Rest  Work-Related Injury: No  Are there other pain locations you wish to document?: No    No notes on file    Review of Systems:  A 14 point review of systems available in the scanned medical record as documented by the patient and reviewed on 10/11/2023. The review is negative with the exception of those things mentioned in the History of Present Illness and Past Medical History. Past Medical History:  Patient's medications, allergies, past medical, surgical, social and family histories were reviewed and updated as appropriate. Allergies: Allergies   Allergen Reactions    Seasonal      Hay fever ,    Simvastatin Other (See Comments) and Swelling     Myalgias severe  Other reaction(s):  Other (See Comments)  Leg pain

## 2023-11-09 ENCOUNTER — TELEPHONE (OUTPATIENT)
Dept: ORTHOPEDIC SURGERY | Age: 58
End: 2023-11-09

## 2023-11-09 NOTE — TELEPHONE ENCOUNTER
CPT: [id number]  [id number]  AUTH: [employer] PER WEBSITE  INSURANCE: [employer]  LOCATION [facility]  AREA OF SX RT SHLD  NOTE: DOC SCANNED

## 2023-11-20 ENCOUNTER — OFFICE VISIT (OUTPATIENT)
Dept: FAMILY MEDICINE CLINIC | Age: 58
End: 2023-11-20
Payer: COMMERCIAL

## 2023-11-20 VITALS
WEIGHT: 240.2 LBS | HEART RATE: 59 BPM | TEMPERATURE: 98.1 F | HEIGHT: 72 IN | DIASTOLIC BLOOD PRESSURE: 82 MMHG | SYSTOLIC BLOOD PRESSURE: 128 MMHG | OXYGEN SATURATION: 95 % | BODY MASS INDEX: 32.53 KG/M2

## 2023-11-20 DIAGNOSIS — I10 ESSENTIAL HYPERTENSION: ICD-10-CM

## 2023-11-20 DIAGNOSIS — R73.9 HYPERGLYCEMIA: ICD-10-CM

## 2023-11-20 DIAGNOSIS — N02.B9 IGA NEPHROPATHY: ICD-10-CM

## 2023-11-20 DIAGNOSIS — E78.00 HYPERCHOLESTEROLEMIA: ICD-10-CM

## 2023-11-20 DIAGNOSIS — Z01.810 PREOP CARDIOVASCULAR EXAM: Primary | ICD-10-CM

## 2023-11-20 DIAGNOSIS — M75.121 NONTRAUMATIC COMPLETE TEAR OF RIGHT ROTATOR CUFF: ICD-10-CM

## 2023-11-20 PROCEDURE — 93000 ELECTROCARDIOGRAM COMPLETE: CPT | Performed by: FAMILY MEDICINE

## 2023-11-20 PROCEDURE — 3074F SYST BP LT 130 MM HG: CPT | Performed by: FAMILY MEDICINE

## 2023-11-20 PROCEDURE — 3079F DIAST BP 80-89 MM HG: CPT | Performed by: FAMILY MEDICINE

## 2023-11-20 PROCEDURE — 99214 OFFICE O/P EST MOD 30 MIN: CPT | Performed by: FAMILY MEDICINE

## 2023-11-20 RX ORDER — M-VIT,TX,IRON,MINS/CALC/FOLIC 27MG-0.4MG
1 TABLET ORAL DAILY
COMMUNITY

## 2023-11-20 NOTE — PROGRESS NOTES
Subjective:    Chief Complaint:     Alba Sumner is a 66 y.o. male who presents for a preoperative physical examination. He is scheduled to have R shoulder arthroscopy and rotator cuff repair done by Dr. Hoa Reaves at [facility] on 11/27/2023. Pt has had some chronic right shoulder pain and was referred to ortho for eval.  Pt was evaluated and tx wthi conservative therapy but with persistent sx and had MRI showing    1. Cuff tendinopathy and peritendinobursitis with full-thickness tear anterior insertion   supraspinatus tendon measuring 1.2 x 1.1 cm.   2. Subscapularis tendinopathy with long segment interstitial tearing measuring 2cm in length   along the superior fibers. 3. Interstitial tearing infraspinatus measuring 1.3cm in length. 4. Glenoid labrum fraying with superior labral tear extending posteriorly (SLAP 2B). 5. Biceps long head tendon tendinopathy with tendinosis along the arcuate portion. Biceps   tendinitis and peritendinitis. Pts goal is to get back to work in 2 months, with limitations. Pt will be limited due to his job, does a lot of use with his arms. Pt finds at this time, can work despite the pain. Pt likes to fish, kayak, and plays frisbee golf and has decided to get this done    Pt here for follow up of blood pressure. Pt states doing great with adherence to therapy and feels well. No issues of chest pain, shortness of breath. No vision changes, headache, swelling in legs. Pt had stopped his blood pressure meds but noted a bump in his blood pressure readings, is now back on amlodipine and lisinopril. Here for f/u of cholesterol. Pt as been working on diet/exercise and is consistent with therapy. No side effects from current therapy. No chest pain, shortness of breath. No numbness/tingling in extremities.   Pt did not tolerate statin therapy at this time,     History of Present Illness:          Past Medical History:   Diagnosis Date    Eczema     Essential hypertension

## 2023-11-21 ENCOUNTER — TELEPHONE (OUTPATIENT)
Dept: FAMILY MEDICINE CLINIC | Age: 58
End: 2023-11-21

## 2023-11-21 LAB
ALBUMIN SERPL-MCNC: 4.8 G/DL (ref 3.4–5)
ALBUMIN/GLOB SERPL: 2.3 {RATIO} (ref 1.1–2.2)
ALP SERPL-CCNC: 50 U/L (ref 40–129)
ALT SERPL-CCNC: 23 U/L (ref 10–40)
ANION GAP SERPL CALCULATED.3IONS-SCNC: 13 MMOL/L (ref 3–16)
AST SERPL-CCNC: 19 U/L (ref 15–37)
BACTERIA URNS QL MICRO: NORMAL /HPF
BASOPHILS # BLD: 0 K/UL (ref 0–0.2)
BASOPHILS NFR BLD: 1 %
BILIRUB SERPL-MCNC: 0.6 MG/DL (ref 0–1)
BILIRUB UR QL STRIP.AUTO: NEGATIVE
BUN SERPL-MCNC: 15 MG/DL (ref 7–20)
CALCIUM SERPL-MCNC: 9.7 MG/DL (ref 8.3–10.6)
CHLORIDE SERPL-SCNC: 101 MMOL/L (ref 99–110)
CHOLEST SERPL-MCNC: 216 MG/DL (ref 0–199)
CLARITY UR: CLEAR
CO2 SERPL-SCNC: 25 MMOL/L (ref 21–32)
COLOR UR: YELLOW
CREAT SERPL-MCNC: 1 MG/DL (ref 0.9–1.3)
CREAT UR-MCNC: 182.2 MG/DL (ref 39–259)
DEPRECATED RDW RBC AUTO: 13.2 % (ref 12.4–15.4)
EOSINOPHIL # BLD: 0.1 K/UL (ref 0–0.6)
EOSINOPHIL NFR BLD: 1.7 %
EPI CELLS #/AREA URNS AUTO: 0 /HPF (ref 0–5)
EST. AVERAGE GLUCOSE BLD GHB EST-MCNC: 116.9 MG/DL
GFR SERPLBLD CREATININE-BSD FMLA CKD-EPI: >60 ML/MIN/{1.73_M2}
GLUCOSE SERPL-MCNC: 92 MG/DL (ref 70–99)
GLUCOSE UR STRIP.AUTO-MCNC: NEGATIVE MG/DL
HBA1C MFR BLD: 5.7 %
HCT VFR BLD AUTO: 45.3 % (ref 40.5–52.5)
HDLC SERPL-MCNC: 34 MG/DL (ref 40–60)
HGB BLD-MCNC: 15.2 G/DL (ref 13.5–17.5)
HGB UR QL STRIP.AUTO: NEGATIVE
HYALINE CASTS #/AREA URNS AUTO: 1 /LPF (ref 0–8)
KETONES UR STRIP.AUTO-MCNC: ABNORMAL MG/DL
LDLC SERPL CALC-MCNC: 161 MG/DL
LEUKOCYTE ESTERASE UR QL STRIP.AUTO: NEGATIVE
LYMPHOCYTES # BLD: 1.5 K/UL (ref 1–5.1)
LYMPHOCYTES NFR BLD: 33.4 %
MCH RBC QN AUTO: 30.8 PG (ref 26–34)
MCHC RBC AUTO-ENTMCNC: 33.6 G/DL (ref 31–36)
MCV RBC AUTO: 91.5 FL (ref 80–100)
MICROALBUMIN UR DL<=1MG/L-MCNC: 16.1 MG/DL
MICROALBUMIN/CREAT UR: 88.4 MG/G (ref 0–30)
MONOCYTES # BLD: 0.5 K/UL (ref 0–1.3)
MONOCYTES NFR BLD: 11.4 %
NEUTROPHILS # BLD: 2.4 K/UL (ref 1.7–7.7)
NEUTROPHILS NFR BLD: 52.5 %
NITRITE UR QL STRIP.AUTO: NEGATIVE
PH UR STRIP.AUTO: 6 [PH] (ref 5–8)
PLATELET # BLD AUTO: 281 K/UL (ref 135–450)
PMV BLD AUTO: 9.4 FL (ref 5–10.5)
POTASSIUM SERPL-SCNC: 4.6 MMOL/L (ref 3.5–5.1)
PROT SERPL-MCNC: 6.9 G/DL (ref 6.4–8.2)
PROT UR STRIP.AUTO-MCNC: 30 MG/DL
RBC # BLD AUTO: 4.95 M/UL (ref 4.2–5.9)
RBC CLUMPS #/AREA URNS AUTO: 1 /HPF (ref 0–4)
SODIUM SERPL-SCNC: 139 MMOL/L (ref 136–145)
SP GR UR STRIP.AUTO: 1.02 (ref 1–1.03)
TRIGL SERPL-MCNC: 105 MG/DL (ref 0–150)
UA DIPSTICK W REFLEX MICRO PNL UR: YES
URN SPEC COLLECT METH UR: ABNORMAL
UROBILINOGEN UR STRIP-ACNC: 0.2 E.U./DL
VLDLC SERPL CALC-MCNC: 21 MG/DL
WBC # BLD AUTO: 4.6 K/UL (ref 4–11)
WBC #/AREA URNS AUTO: 0 /HPF (ref 0–5)

## 2023-11-21 NOTE — TELEPHONE ENCOUNTER
Pt. Viewed test results on line and concerned about the albumin/globulin ration being elevated. Any concerns or anything pt. Needs to do for this?

## 2023-11-22 ENCOUNTER — TELEPHONE (OUTPATIENT)
Dept: ORTHOPEDIC SURGERY | Age: 58
End: 2023-11-22

## 2023-11-22 NOTE — TELEPHONE ENCOUNTER
Other PATIENT CALLED NEEDS TO KNOW HOW HE WILL GET HIS POST OP ICE PACK FOR HIS SHOULDER OR IS THIS SOMETHING HE NEEDS TO GET HIMSELF. PLS CALL TO [facility] [phone]

## 2023-11-24 ENCOUNTER — ANESTHESIA EVENT (OUTPATIENT)
Dept: OPERATING ROOM | Age: 58
End: 2023-11-24
Payer: COMMERCIAL

## 2023-11-27 ENCOUNTER — ANESTHESIA (OUTPATIENT)
Dept: OPERATING ROOM | Age: 58
End: 2023-11-27
Payer: COMMERCIAL

## 2023-11-27 ENCOUNTER — HOSPITAL ENCOUNTER (OUTPATIENT)
Age: 58
Setting detail: OUTPATIENT SURGERY
Discharge: HOME | End: 2023-11-27
Attending: ORTHOPAEDIC SURGERY | Admitting: ORTHOPAEDIC SURGERY
Payer: COMMERCIAL

## 2023-11-27 VITALS
TEMPERATURE: 97 F | HEIGHT: 72 IN | DIASTOLIC BLOOD PRESSURE: 83 MMHG | OXYGEN SATURATION: 95 % | WEIGHT: 240.6 LBS | BODY MASS INDEX: 32.59 KG/M2 | HEART RATE: 70 BPM | RESPIRATION RATE: 20 BRPM | SYSTOLIC BLOOD PRESSURE: 133 MMHG

## 2023-11-27 DIAGNOSIS — M67.919 DISORDER OF ROTATOR CUFF, UNSPECIFIED LATERALITY: Primary | ICD-10-CM

## 2023-11-27 LAB
GLUCOSE BLD-MCNC: 95 MG/DL (ref 70–99)
PERFORMED ON: NORMAL

## 2023-11-27 PROCEDURE — 6360000002 HC RX W HCPCS: Performed by: NURSE ANESTHETIST, CERTIFIED REGISTERED

## 2023-11-27 PROCEDURE — 6360000002 HC RX W HCPCS: Performed by: ANESTHESIOLOGY

## 2023-11-27 PROCEDURE — 2709999900 HC NON-CHARGEABLE SUPPLY: Performed by: ORTHOPAEDIC SURGERY

## 2023-11-27 PROCEDURE — 3700000001 HC ADD 15 MINUTES (ANESTHESIA): Performed by: ORTHOPAEDIC SURGERY

## 2023-11-27 PROCEDURE — 2580000003 HC RX 258: Performed by: ORTHOPAEDIC SURGERY

## 2023-11-27 PROCEDURE — 2500000003 HC RX 250 WO HCPCS: Performed by: NURSE ANESTHETIST, CERTIFIED REGISTERED

## 2023-11-27 PROCEDURE — 3600000014 HC SURGERY LEVEL 4 ADDTL 15MIN: Performed by: ORTHOPAEDIC SURGERY

## 2023-11-27 PROCEDURE — 2720000010 HC SURG SUPPLY STERILE: Performed by: ORTHOPAEDIC SURGERY

## 2023-11-27 PROCEDURE — 7100000011 HC PHASE II RECOVERY - ADDTL 15 MIN: Performed by: ORTHOPAEDIC SURGERY

## 2023-11-27 PROCEDURE — 3700000000 HC ANESTHESIA ATTENDED CARE: Performed by: ORTHOPAEDIC SURGERY

## 2023-11-27 PROCEDURE — 3600000004 HC SURGERY LEVEL 4 BASE: Performed by: ORTHOPAEDIC SURGERY

## 2023-11-27 PROCEDURE — C1713 ANCHOR/SCREW BN/BN,TIS/BN: HCPCS | Performed by: ORTHOPAEDIC SURGERY

## 2023-11-27 PROCEDURE — 6370000000 HC RX 637 (ALT 250 FOR IP): Performed by: ANESTHESIOLOGY

## 2023-11-27 PROCEDURE — 7100000000 HC PACU RECOVERY - FIRST 15 MIN: Performed by: ORTHOPAEDIC SURGERY

## 2023-11-27 PROCEDURE — 6360000002 HC RX W HCPCS: Performed by: ORTHOPAEDIC SURGERY

## 2023-11-27 PROCEDURE — 7100000001 HC PACU RECOVERY - ADDTL 15 MIN: Performed by: ORTHOPAEDIC SURGERY

## 2023-11-27 PROCEDURE — 64415 NJX AA&/STRD BRCH PLXS IMG: CPT | Performed by: ANESTHESIOLOGY

## 2023-11-27 PROCEDURE — 7100000010 HC PHASE II RECOVERY - FIRST 15 MIN: Performed by: ORTHOPAEDIC SURGERY

## 2023-11-27 PROCEDURE — 2500000003 HC RX 250 WO HCPCS: Performed by: ORTHOPAEDIC SURGERY

## 2023-11-27 PROCEDURE — C9290 INJ, BUPIVACAINE LIPOSOME: HCPCS | Performed by: ANESTHESIOLOGY

## 2023-11-27 PROCEDURE — 2580000003 HC RX 258: Performed by: ANESTHESIOLOGY

## 2023-11-27 DEVICE — HEALICOIL RG SA 5.5MM W/3 UB-BL CB                                    BL BK
Type: IMPLANTABLE DEVICE | Site: SHOULDER | Status: FUNCTIONAL
Brand: HEALICOIL / ULTRABRAID

## 2023-11-27 DEVICE — SCREW INTFR L15MM DIA5.5MM W/ SZ 2 FIBERWIRE SUT AND DISP: Type: IMPLANTABLE DEVICE | Site: SHOULDER | Status: FUNCTIONAL

## 2023-11-27 DEVICE — ANCHOR SUTURE BIOCOMP 4.75X19.1 MM SWIVELOCK C: Type: IMPLANTABLE DEVICE | Site: SHOULDER | Status: FUNCTIONAL

## 2023-11-27 RX ORDER — FENTANYL CITRATE 50 UG/ML
INJECTION, SOLUTION INTRAMUSCULAR; INTRAVENOUS PRN
Status: DISCONTINUED | OUTPATIENT
Start: 2023-11-27 | End: 2023-11-27 | Stop reason: SDUPTHER

## 2023-11-27 RX ORDER — LIDOCAINE HCL/PF 100 MG/5ML
SYRINGE (ML) INJECTION PRN
Status: DISCONTINUED | OUTPATIENT
Start: 2023-11-27 | End: 2023-11-27 | Stop reason: SDUPTHER

## 2023-11-27 RX ORDER — ACETAMINOPHEN 325 MG/1
650 TABLET ORAL
Status: DISCONTINUED | OUTPATIENT
Start: 2023-11-27 | End: 2023-11-27 | Stop reason: HOSPADM

## 2023-11-27 RX ORDER — SODIUM CHLORIDE, SODIUM LACTATE, POTASSIUM CHLORIDE, CALCIUM CHLORIDE 600; 310; 30; 20 MG/100ML; MG/100ML; MG/100ML; MG/100ML
INJECTION, SOLUTION INTRAVENOUS CONTINUOUS
Status: DISCONTINUED | OUTPATIENT
Start: 2023-11-27 | End: 2023-11-27 | Stop reason: HOSPADM

## 2023-11-27 RX ORDER — IPRATROPIUM BROMIDE AND ALBUTEROL SULFATE 2.5; .5 MG/3ML; MG/3ML
1 SOLUTION RESPIRATORY (INHALATION)
Status: DISCONTINUED | OUTPATIENT
Start: 2023-11-27 | End: 2023-11-27 | Stop reason: HOSPADM

## 2023-11-27 RX ORDER — KETAMINE HCL IN NACL, ISO-OSM 20 MG/2 ML
SYRINGE (ML) INJECTION PRN
Status: DISCONTINUED | OUTPATIENT
Start: 2023-11-27 | End: 2023-11-27 | Stop reason: SDUPTHER

## 2023-11-27 RX ORDER — ONDANSETRON 2 MG/ML
INJECTION INTRAMUSCULAR; INTRAVENOUS PRN
Status: DISCONTINUED | OUTPATIENT
Start: 2023-11-27 | End: 2023-11-27 | Stop reason: SDUPTHER

## 2023-11-27 RX ORDER — SODIUM CHLORIDE 0.9 % (FLUSH) 0.9 %
5-40 SYRINGE (ML) INJECTION PRN
Status: DISCONTINUED | OUTPATIENT
Start: 2023-11-27 | End: 2023-11-27 | Stop reason: HOSPADM

## 2023-11-27 RX ORDER — ROCURONIUM BROMIDE 10 MG/ML
INJECTION, SOLUTION INTRAVENOUS PRN
Status: DISCONTINUED | OUTPATIENT
Start: 2023-11-27 | End: 2023-11-27 | Stop reason: SDUPTHER

## 2023-11-27 RX ORDER — LABETALOL HYDROCHLORIDE 5 MG/ML
10 INJECTION, SOLUTION INTRAVENOUS
Status: DISCONTINUED | OUTPATIENT
Start: 2023-11-27 | End: 2023-11-27 | Stop reason: HOSPADM

## 2023-11-27 RX ORDER — SODIUM CHLORIDE 0.9 % (FLUSH) 0.9 %
5-40 SYRINGE (ML) INJECTION EVERY 12 HOURS SCHEDULED
Status: DISCONTINUED | OUTPATIENT
Start: 2023-11-27 | End: 2023-11-27 | Stop reason: HOSPADM

## 2023-11-27 RX ORDER — MIDAZOLAM HYDROCHLORIDE 1 MG/ML
INJECTION INTRAMUSCULAR; INTRAVENOUS
Status: COMPLETED
Start: 2023-11-27 | End: 2023-11-27

## 2023-11-27 RX ORDER — ONDANSETRON 2 MG/ML
4 INJECTION INTRAMUSCULAR; INTRAVENOUS
Status: DISCONTINUED | OUTPATIENT
Start: 2023-11-27 | End: 2023-11-27 | Stop reason: HOSPADM

## 2023-11-27 RX ORDER — FENTANYL CITRATE 50 UG/ML
25 INJECTION, SOLUTION INTRAMUSCULAR; INTRAVENOUS EVERY 5 MIN PRN
Status: DISCONTINUED | OUTPATIENT
Start: 2023-11-27 | End: 2023-11-27 | Stop reason: HOSPADM

## 2023-11-27 RX ORDER — HYDROMORPHONE HYDROCHLORIDE 1 MG/ML
0.5 INJECTION, SOLUTION INTRAMUSCULAR; INTRAVENOUS; SUBCUTANEOUS EVERY 5 MIN PRN
Status: DISCONTINUED | OUTPATIENT
Start: 2023-11-27 | End: 2023-11-27 | Stop reason: HOSPADM

## 2023-11-27 RX ORDER — MIDAZOLAM HYDROCHLORIDE 1 MG/ML
INJECTION INTRAMUSCULAR; INTRAVENOUS PRN
Status: DISCONTINUED | OUTPATIENT
Start: 2023-11-27 | End: 2023-11-27 | Stop reason: SDUPTHER

## 2023-11-27 RX ORDER — SODIUM CHLORIDE 9 MG/ML
INJECTION, SOLUTION INTRAVENOUS PRN
Status: DISCONTINUED | OUTPATIENT
Start: 2023-11-27 | End: 2023-11-27 | Stop reason: HOSPADM

## 2023-11-27 RX ORDER — ASPIRIN 325 MG
325 TABLET ORAL 2 TIMES DAILY
Qty: 28 TABLET | Refills: 0 | Status: SHIPPED | OUTPATIENT
Start: 2023-11-27 | End: 2023-12-11

## 2023-11-27 RX ORDER — ONDANSETRON 4 MG/1
4 TABLET, FILM COATED ORAL DAILY PRN
Qty: 30 TABLET | Refills: 0 | Status: SHIPPED | OUTPATIENT
Start: 2023-11-27

## 2023-11-27 RX ORDER — BUPIVACAINE HYDROCHLORIDE 5 MG/ML
INJECTION, SOLUTION EPIDURAL; INTRACAUDAL PRN
Status: DISCONTINUED | OUTPATIENT
Start: 2023-11-27 | End: 2023-11-27 | Stop reason: HOSPADM

## 2023-11-27 RX ORDER — DEXAMETHASONE SODIUM PHOSPHATE 4 MG/ML
INJECTION, SOLUTION INTRA-ARTICULAR; INTRALESIONAL; INTRAMUSCULAR; INTRAVENOUS; SOFT TISSUE PRN
Status: DISCONTINUED | OUTPATIENT
Start: 2023-11-27 | End: 2023-11-27 | Stop reason: SDUPTHER

## 2023-11-27 RX ORDER — FENTANYL CITRATE 50 UG/ML
INJECTION, SOLUTION INTRAMUSCULAR; INTRAVENOUS
Status: COMPLETED
Start: 2023-11-27 | End: 2023-11-27

## 2023-11-27 RX ORDER — PROPOFOL 10 MG/ML
INJECTION, EMULSION INTRAVENOUS PRN
Status: DISCONTINUED | OUTPATIENT
Start: 2023-11-27 | End: 2023-11-27 | Stop reason: SDUPTHER

## 2023-11-27 RX ORDER — SENNOSIDES A AND B 8.6 MG/1
1 TABLET, FILM COATED ORAL 2 TIMES DAILY
Qty: 60 TABLET | Refills: 11 | Status: SHIPPED | OUTPATIENT
Start: 2023-11-27 | End: 2024-11-26

## 2023-11-27 RX ORDER — OXYCODONE HYDROCHLORIDE AND ACETAMINOPHEN 5; 325 MG/1; MG/1
1 TABLET ORAL ONCE
Status: COMPLETED | OUTPATIENT
Start: 2023-11-27 | End: 2023-11-27

## 2023-11-27 RX ORDER — BUPIVACAINE HYDROCHLORIDE 5 MG/ML
INJECTION, SOLUTION EPIDURAL; INTRACAUDAL
Status: COMPLETED | OUTPATIENT
Start: 2023-11-27 | End: 2023-11-27

## 2023-11-27 RX ORDER — GLYCOPYRROLATE 0.2 MG/ML
INJECTION INTRAMUSCULAR; INTRAVENOUS PRN
Status: DISCONTINUED | OUTPATIENT
Start: 2023-11-27 | End: 2023-11-27 | Stop reason: SDUPTHER

## 2023-11-27 RX ORDER — OXYCODONE HYDROCHLORIDE AND ACETAMINOPHEN 5; 325 MG/1; MG/1
1 TABLET ORAL EVERY 6 HOURS PRN
Qty: 28 TABLET | Refills: 0 | Status: SHIPPED | OUTPATIENT
Start: 2023-11-27 | End: 2023-12-04

## 2023-11-27 RX ORDER — PROCHLORPERAZINE EDISYLATE 5 MG/ML
5 INJECTION INTRAMUSCULAR; INTRAVENOUS
Status: DISCONTINUED | OUTPATIENT
Start: 2023-11-27 | End: 2023-11-27 | Stop reason: HOSPADM

## 2023-11-27 RX ADMIN — OXYCODONE HYDROCHLORIDE AND ACETAMINOPHEN 1 TABLET: 5; 325 TABLET ORAL at 15:58

## 2023-11-27 RX ADMIN — HYDROMORPHONE HYDROCHLORIDE 0.5 MG: 1 INJECTION, SOLUTION INTRAMUSCULAR; INTRAVENOUS; SUBCUTANEOUS at 15:29

## 2023-11-27 RX ADMIN — BUPIVACAINE HYDROCHLORIDE 10 ML: 5 INJECTION, SOLUTION EPIDURAL; INTRACAUDAL; PERINEURAL at 11:16

## 2023-11-27 RX ADMIN — FENTANYL CITRATE 100 MCG: 50 INJECTION, SOLUTION INTRAMUSCULAR; INTRAVENOUS at 11:15

## 2023-11-27 RX ADMIN — GLYCOPYRROLATE 0.3 MG: 0.2 INJECTION INTRAMUSCULAR; INTRAVENOUS at 13:40

## 2023-11-27 RX ADMIN — FENTANYL CITRATE 50 MCG: 50 INJECTION, SOLUTION INTRAMUSCULAR; INTRAVENOUS at 14:38

## 2023-11-27 RX ADMIN — PROPOFOL 200 MG: 10 INJECTION, EMULSION INTRAVENOUS at 12:45

## 2023-11-27 RX ADMIN — ROCURONIUM BROMIDE 50 MG: 10 INJECTION, SOLUTION INTRAVENOUS at 12:45

## 2023-11-27 RX ADMIN — ONDANSETRON 4 MG: 2 INJECTION INTRAMUSCULAR; INTRAVENOUS at 12:42

## 2023-11-27 RX ADMIN — SODIUM CHLORIDE 2000 MG: 900 INJECTION INTRAVENOUS at 12:49

## 2023-11-27 RX ADMIN — SODIUM CHLORIDE, POTASSIUM CHLORIDE, SODIUM LACTATE AND CALCIUM CHLORIDE: 600; 310; 30; 20 INJECTION, SOLUTION INTRAVENOUS at 10:54

## 2023-11-27 RX ADMIN — BUPIVACAINE 10 ML: 13.3 INJECTION, SUSPENSION, LIPOSOMAL INFILTRATION at 11:16

## 2023-11-27 RX ADMIN — HYDROMORPHONE HYDROCHLORIDE 0.5 MG: 1 INJECTION, SOLUTION INTRAMUSCULAR; INTRAVENOUS; SUBCUTANEOUS at 15:40

## 2023-11-27 RX ADMIN — GLYCOPYRROLATE 0.2 MG: 0.2 INJECTION INTRAMUSCULAR; INTRAVENOUS at 14:45

## 2023-11-27 RX ADMIN — SUGAMMADEX 200 MG: 100 INJECTION, SOLUTION INTRAVENOUS at 15:02

## 2023-11-27 RX ADMIN — DEXAMETHASONE SODIUM PHOSPHATE 8 MG: 4 INJECTION INTRA-ARTICULAR; INTRALESIONAL; INTRAMUSCULAR; INTRAVENOUS; SOFT TISSUE at 12:58

## 2023-11-27 RX ADMIN — MIDAZOLAM HYDROCHLORIDE 2 MG: 2 INJECTION, SOLUTION INTRAMUSCULAR; INTRAVENOUS at 11:15

## 2023-11-27 RX ADMIN — Medication 100 MG: at 12:45

## 2023-11-27 RX ADMIN — FENTANYL CITRATE 50 MCG: 50 INJECTION, SOLUTION INTRAMUSCULAR; INTRAVENOUS at 14:43

## 2023-11-27 RX ADMIN — Medication 20 MG: at 14:25

## 2023-11-27 ASSESSMENT — PAIN SCALES - GENERAL
PAINLEVEL_OUTOF10: 8
PAINLEVEL_OUTOF10: 0
PAINLEVEL_OUTOF10: 4
PAINLEVEL_OUTOF10: 4
PAINLEVEL_OUTOF10: 3
PAINLEVEL_OUTOF10: 7
PAINLEVEL_OUTOF10: 4

## 2023-11-27 ASSESSMENT — PAIN DESCRIPTION - LOCATION
LOCATION: OTHER (COMMENT)
LOCATION: SHOULDER
LOCATION: ARM;SHOULDER;OTHER (COMMENT)

## 2023-11-27 ASSESSMENT — PAIN - FUNCTIONAL ASSESSMENT: PAIN_FUNCTIONAL_ASSESSMENT: PREVENTS OR INTERFERES SOME ACTIVE ACTIVITIES AND ADLS

## 2023-11-27 ASSESSMENT — PAIN DESCRIPTION - ORIENTATION
ORIENTATION: RIGHT

## 2023-11-27 ASSESSMENT — PAIN DESCRIPTION - DESCRIPTORS
DESCRIPTORS: BURNING
DESCRIPTORS: ACHING
DESCRIPTORS: ACHING;BURNING;SORE

## 2023-11-27 ASSESSMENT — PAIN DESCRIPTION - FREQUENCY
FREQUENCY: CONTINUOUS

## 2023-11-27 ASSESSMENT — PAIN DESCRIPTION - DIRECTION: RADIATING_TOWARDS: ARMPIT

## 2023-11-27 ASSESSMENT — PAIN DESCRIPTION - PAIN TYPE
TYPE: ACUTE PAIN;SURGICAL PAIN
TYPE: SURGICAL PAIN
TYPE: CHRONIC PAIN

## 2023-11-27 ASSESSMENT — PAIN DESCRIPTION - ONSET
ONSET: ON-GOING
ONSET: SUDDEN
ONSET: ON-GOING

## 2023-11-27 NOTE — FLOWSHEET NOTE
Medicating for \"burning\" pain under right armpit. Wife updated via [facility] staff.

## 2023-11-27 NOTE — FLOWSHEET NOTE
Patient received from the OR to PACU #10 post RIGHT SHOULDER ARTHROSCOPY, ROTATOR CUFF REPAIR AND BICEPS TENODESIS - Right  of Dr. Shania Larson. Placed on PACU monitoring equipment. Report given per CRNA and OR RN. Per report, patient  was stable during the procedure. On arrival, patient is arouseable, monreal and denies pain. Had pre op block.

## 2023-11-27 NOTE — FLOWSHEET NOTE
Call placed to patient's wife for update. Wife has cataract condition and needs to leave to drive home in the daylight. Patient's son, Renetta Valladares is here now and will provide ride home for patient. Wife is aware that prescriptions are to be picked up at the [facility] in [place].

## 2023-11-27 NOTE — ANESTHESIA POSTPROCEDURE EVALUATION
Department of Anesthesiology  Postprocedure Note    Patient: Shivani Huizar  MRN: [id number]  YOB: 1965  Date of evaluation: 11/27/2023      Procedure Summary       Date: 11/27/23 Room / Location: [facility] OR 09 / The [address] [facility]    Anesthesia Start: 8488 Anesthesia Stop: 1441    Procedure: RIGHT SHOULDER ARTHROSCOPY, ROTATOR CUFF REPAIR AND BICEPS TENODESIS (Right: Shoulder) Diagnosis:       Biceps tendinitis of right shoulder      (Biceps tendinitis of right shoulder [M75.21])    Surgeons: Rigoberto Rodriguez MD Responsible Provider: Cassidy Matthews MD    Anesthesia Type: general, regional ASA Status: 2            Anesthesia Type: No value filed.     Geovanny Phase I: Geovanny Score: 10    Geovanny Phase II: Geovanny Score: 10      Anesthesia Post Evaluation    Patient location during evaluation: PACU  Patient participation: complete - patient participated  Level of consciousness: awake and alert  Pain score: 4  Airway patency: patent  Nausea & Vomiting: no nausea and no vomiting  Cardiovascular status: blood pressure returned to baseline  Respiratory status: acceptable  Hydration status: euvolemic  Multimodal analgesia pain management approach  Pain management: adequate

## 2023-11-27 NOTE — BRIEF OP NOTE
Brief Postoperative Note      Patient: Fransisca Alfaro  YOB: 1965  MRN: [id number]    Date of Procedure: 11/27/2023    Pre-Op Diagnosis Codes:     * Biceps tendinitis of right shoulder [M75.21]    Post-Op Diagnosis: Post-Op Diagnosis Codes:     * Biceps tendinitis of right shoulder [M75.21]       Procedure(s):  RIGHT SHOULDER ARTHROSCOPY, ROTATOR CUFF REPAIR AND BICEPS TENODESIS    Surgeon(s):  MD Sreedhar Ordonez MD    Assistant:  Surgical Assistant: Nancy John  Fellow: Paula Santacruz MD    Anesthesia: General    Estimated Blood Loss (mL): less than 046     Complications: None    Specimens:   * No specimens in log *    Implants:  * No implants in log *      Drains: * No LDAs found *    Findings: Large RTC tear, Biceps tendonitis with partial tear      Electronically signed by Paula Santacruz MD on 11/27/2023 at 2:42 PM

## 2023-11-27 NOTE — H&P
importance of physical therapy and home exercises after surgery. All questions were answered and written informed consent for surgery was obtained today. Lizy Emily will follow up on his day of surgery and/or as needed. He was in agreement with this plan and all questions were answered to her satisfaction. She was encouraged to call with any questions.

## 2023-11-27 NOTE — ANESTHESIA PROCEDURE NOTES
Peripheral Block    Patient location during procedure: pre-op  Reason for block: post-op pain management and at surgeon's request  Start time: 11/27/2023 11:16 AM  End time: 11/27/2023 11:19 AM  Staffing  Performed: anesthesiologist   Anesthesiologist: Misha Ivory MD  Performed by: Misha Ivory MD  Authorized by: Misha Ivory MD    Preanesthetic Checklist  Completed: patient identified, IV checked, site marked, risks and benefits discussed, surgical/procedural consents, equipment checked, pre-op evaluation, timeout performed, anesthesia consent given, oxygen available, monitors applied/VS acknowledged, fire risk safety assessment completed and verbalized and blood product R/B/A discussed and consented  Peripheral Block   Patient position: supine  Prep: ChloraPrep  Provider prep: mask and sterile gloves  Patient monitoring: cardiac monitor, continuous pulse ox, continuous capnometry, frequent blood pressure checks, IV access, oxygen and responsive to questions  Block type: Brachial plexus  Interscalene  Laterality: right  Injection technique: single-shot  Guidance: ultrasound guided    Needle   Needle type: insulated echogenic nerve stimulator needle   Needle gauge: 22 G  Needle localization: ultrasound guidance  Needle length: 8 cm  Assessment   Injection assessment: negative aspiration for heme, no paresthesia on injection, local visualized surrounding nerve on ultrasound, no intravascular symptoms and low pressure verified by pressure monitor  Paresthesia pain: none  Slow fractionated injection: yes  Hemodynamics: stable  Real-time US image taken/store: yes  Outcomes: uncomplicated and patient tolerated procedure well    Additional Notes  10 ml 0.5% bupivacaine mixed with 10 ml exparel, injected in 2 ml increments with negative aspiration between. No complications.   Medications Administered  bupivacaine (MARCAINE) PF injection 0.5% - Perineural   10 mL - 11/27/2023 11:16:00 AM  bupivacaine liposome

## 2023-11-28 ENCOUNTER — TELEPHONE (OUTPATIENT)
Dept: ORTHOPEDIC SURGERY | Age: 58
End: 2023-11-28

## 2023-11-28 NOTE — OP NOTE
inserted. This allowed control  of the tendon. We dunked it in the bone window and then secured in  interference screw. We made sure the interference screw was flushed  with anterior cortical surface. It was stable on gentle tugging. We  tied a pair of sutures within the interference screw and a pair of  sutures within the tendon. This reinforced the repair in a  pants-over-vest type fashion. Tails were cut short. We irrigated  copiously, closed the wound in layers. We used a 3-0 Vicryl in an  interrupted inverted fashion to close the subcutaneous tissue. We used  4-0 Monocryl closure, Dermabond type dressing to close the biceps  incision. This was also used to close the arthroscopic portals,  anterolateral, posterior as well as the accessory anterior and accessory  posterior lateral portals. We infiltrated 0.5% Marcaine into biceps  incision. A sterile compressive dressing was applied. The arm was  placed in a padded soft brace. The patient was repositioned in the  supine position before this and promptly awakened from anesthesia,  having tolerated the procedure well and was taken from the operating  room to the recovery room in satisfactory condition. PLAN:  The patient will be discharged on oral analgesics with  instructions to follow up in the office in one week as an outpatient.         Payton Goetz MD    D: 11/27/2023 15:01:30       T: 11/28/2023 1:36:23     DENISE/[email or web address]  Job#: [id number]     Doc#: [id number]    CC:

## 2023-11-28 NOTE — TELEPHONE ENCOUNTER
Surgery Follow Up     Contact Name: Junie Monzon    Had surgery yesterday and was told they they may need to be scheduled for Friday as a follow up instead of Dec 6th says they did not receive much detail on after care. Also requesting a call back to receive a copy of the details of the procedure     Patient Contact Number: [phone]

## 2023-12-01 ENCOUNTER — EVALUATION (OUTPATIENT)
Dept: PHYSICAL THERAPY | Age: 58
End: 2023-12-01
Payer: COMMERCIAL

## 2023-12-01 DIAGNOSIS — M75.21 BICEPS TENDONITIS ON RIGHT: ICD-10-CM

## 2023-12-01 DIAGNOSIS — M75.101 NONTRAUMATIC TEAR OF RIGHT ROTATOR CUFF, UNSPECIFIED TEAR EXTENT: ICD-10-CM

## 2023-12-01 DIAGNOSIS — M25.511 ACUTE POSTOPERATIVE PAIN OF RIGHT SHOULDER: Primary | ICD-10-CM

## 2023-12-01 DIAGNOSIS — G89.18 ACUTE POSTOPERATIVE PAIN OF RIGHT SHOULDER: Primary | ICD-10-CM

## 2023-12-01 DIAGNOSIS — M25.511 RIGHT SHOULDER PAIN, UNSPECIFIED CHRONICITY: ICD-10-CM

## 2023-12-01 PROCEDURE — 97161 PT EVAL LOW COMPLEX 20 MIN: CPT | Performed by: PHYSICAL THERAPIST

## 2023-12-01 PROCEDURE — 97530 THERAPEUTIC ACTIVITIES: CPT | Performed by: PHYSICAL THERAPIST

## 2023-12-01 PROCEDURE — 97110 THERAPEUTIC EXERCISES: CPT | Performed by: PHYSICAL THERAPIST

## 2023-12-01 NOTE — PROGRESS NOTES
[facility]            [address] [address]              [phone]  J:[phone]      Physical Therapy: TREATMENT/PROGRESS NOTE   Patient: Eddie Liu (37 y.o. male)   Treatment Date: 2023   :  1965 MRN: [id number]   Visit #: 1   Insurance Allowable Auth Needed   90 []Yes    [x]No    Insurance: Payor: Summer Garcia / Plan: [employer] / Product Type: *No Product type* /   Insurance ID: [id number] - ([facility])  Secondary Insurance (if applicable):    Treatment Diagnosis:     ICD-10-CM    1. Acute postoperative pain of right shoulder  G89.18     M25.511       2. Nontraumatic tear of right rotator cuff, unspecified tear extent  M75.101       3. Biceps tendonitis on right  M75.21          Medical Diagnosis:    PROCEDURE:   s/p right RTC repair with OBT  DOS:   2023   Referring Physician: Jong Pantoja MD  PCP: Tiago Cunningham MD                             Plan of care signed (Y/N):     Date of Patient follow up with Physician: 2023     Progress Report/POC: EVAL today  POC update due: (10 visits [address], whichever is less)  2023     In sling at all times except to bathe and do HEP x3 weeks, passive elbow flexion x3 weeks then active only x3 weeks no biceps resistance for 6 weeks.    Shoulder AAROM 90 flex and 0 ER for 2 weeks    Preferred Language for Healthcare:   [x]English       []other:    SUBJECTIVE EXAMINATION     Patient Report/Comments: see eval     Test used Initial score  2023   Pain Summary VAS 3-6    Functional questionnaire Upper Extremity functional Scale 100% disability    Other:                OBJECTIVE EXAMINATION       ROM PROM AROM  Comment     L R L R 2023   Flexion   ~45 table slide 170       Abduction     170       ER   NT         IR   NT T9       Elbow Flex   ~130 WNL       Elbow Ext   -20 WNL             Strength: deferred secondary to surgery L R Comment
Term Goals: To be achieved in: 8-12weeks  Disability index score of 30% or less for the Upper Extremity Functional Scale to assist with return to prior level of function. Status: [] Progressing: [] Met: [] Not Met: [] Adjusted  Improve shoulder AAROM to [facility] to allow for proper joint functioning as indicated by patients functional deficits. Status: [] Progressing: [] Met: [] Not Met: [] Adjusted  Pt to improve strength to 4+/5 or better of rotator cuff, scapular retractors, and shoulder elevators to allow for proper muscle and joint use in functional mobility, ADLs and prior level of function   Status: [] Progressing: [] Met: [] Not Met: [] Adjusted  Patient will return to Reaching activities, Lifting, and Driving without increased symptoms or restriction to work towards return to prior level of function. Status: [] Progressing: [] Met: [] Not Met: [] Adjusted  Patient will increase UE function to return to work with regular duties. Status: [] Progressing: [] Met: [] Not Met: [] Adjusted    TREATMENT PLAN     Frequency/Duration: 1-2x/week for 8-10 weeks for the following treatment interventions:    Interventions:  [x] Therapeutic exercise including: strength training, ROM, including postural re-education. [x] NMR activation and proprioception, including postural re-education. [x] Manual therapy as indicated to include: PROM, Gr I-IV mobilizations, and STM  [x] Modalities as needed that may include: Cryotherapy  [x] Patient education on joint protection, postural re-education, activity modification, progression of HEP. [] Aquatic Therapy     HEP instruction: Refer to HEP instructions outlined on the flowsheet on 12/01/2023.     Electronically Signed by Teo Bullock, PT  Date: 57/59/8017      Board Certified Orthopaedic Clinical Specialist  TPI Certified  Physical Therapist    [place] PT #[id number]  [facility],[address] [place] [address] #[id number]

## 2023-12-06 ENCOUNTER — OFFICE VISIT (OUTPATIENT)
Dept: ORTHOPEDIC SURGERY | Age: 58
End: 2023-12-06

## 2023-12-06 VITALS — HEIGHT: 72 IN | WEIGHT: 240 LBS | BODY MASS INDEX: 32.51 KG/M2

## 2023-12-06 DIAGNOSIS — Z98.890 S/P RIGHT ROTATOR CUFF REPAIR: Primary | ICD-10-CM

## 2023-12-06 PROCEDURE — 99024 POSTOP FOLLOW-UP VISIT: CPT | Performed by: ORTHOPAEDIC SURGERY

## 2023-12-06 NOTE — PROGRESS NOTES
History of Present Illness:  Kt Mcdaniels is a pleasant 66 y.o. male who presents for a post operative visit. He is one week out following a right shoulder arthroscopic rotator cuff repair with open biceps tenodesis on 11/27/23. Overall He is doing okay and feels that their pain is well controlled with current pain medications. He has been compliant with wearing the UltraSling brace at all times. He plans to do physical therapy at the [facility] office. He denies fevers, chills, numbness, tingling, and shortness of breath. He is accompanied today by his wife. Medical History:  Patient's medications, allergies, past medical, surgical, social and family histories were reviewed and updated as appropriate. No notes on file    Review of Systems  A 14 point review of systems was completed by the patient and is available in the media section of the scanned medical record and was reviewed on 12/6/2023. Vital Signs: There were no vitals filed for this visit. General/Appearance: Alert and oriented and in no apparent distress. Skin:  There are no skin lesions, cellulitis, or extreme edema. The patient has warm and well-perfused Bilateral upper extremities with brisk capillary refill. Right Shoulder Exam:    Inspection: Shoulder incision and portals are clean, dry and intact and well approximated. The Prineo dressing is still in place. Mild ecchymosis and swelling are present as can be expected. There is no erythema, drainage or other signs of infection    Palpation:  No crepitus to gentle motion    Active Range of Motion: Deferred    Passive Range of Motion:  Deferred    Strength:  Deferred    Special Tests:  Deferred. Neurovascular: Sensation to light touch is intact, no motor deficits, palpable radial pulses 2+    Radiology:     No new XR obtained at this time.          Assessment :  Mr. Kt Mcdaniels is a pleasant 66 y.o. patient who is one week out following a right shoulder arthroscopic rotator cuff

## 2023-12-07 ENCOUNTER — TREATMENT (OUTPATIENT)
Dept: PHYSICAL THERAPY | Age: 58
End: 2023-12-07
Payer: COMMERCIAL

## 2023-12-07 DIAGNOSIS — M25.511 ACUTE POSTOPERATIVE PAIN OF RIGHT SHOULDER: Primary | ICD-10-CM

## 2023-12-07 DIAGNOSIS — M75.101 NONTRAUMATIC TEAR OF RIGHT ROTATOR CUFF, UNSPECIFIED TEAR EXTENT: ICD-10-CM

## 2023-12-07 DIAGNOSIS — G89.18 ACUTE POSTOPERATIVE PAIN OF RIGHT SHOULDER: Primary | ICD-10-CM

## 2023-12-07 DIAGNOSIS — M75.21 BICEPS TENDONITIS ON RIGHT: ICD-10-CM

## 2023-12-07 PROCEDURE — 97530 THERAPEUTIC ACTIVITIES: CPT | Performed by: PHYSICAL THERAPIST

## 2023-12-07 PROCEDURE — 97110 THERAPEUTIC EXERCISES: CPT | Performed by: PHYSICAL THERAPIST

## 2023-12-07 NOTE — PROGRESS NOTES
[facility]            [facility] [address] [address]              Y:[phone]  T:[phone]      Physical Therapy: TREATMENT/PROGRESS NOTE   Patient: Jillian Ferro (33 y.o. male)   Treatment Date: 2023   :  1965 MRN: [id number]   Visit #: 2   Insurance Allowable Auth Needed   90 []Yes    [x]No    Insurance: Payor: Mia Martinez / Plan: Ivan Nath / Product Type: *No Product type* /   Insurance ID: [id number] - ([employer] Tamez)  Secondary Insurance (if applicable):    Treatment Diagnosis:     ICD-10-CM    1. Acute postoperative pain of right shoulder  G89.18     M25.511       2. Nontraumatic tear of right rotator cuff, unspecified tear extent  M75.101       3. Biceps tendonitis on right  M75.21          Medical Diagnosis:    PROCEDURE:   s/p right RTC repair with OBT  DOS:   2023   Referring Physician: Jennifer Loomis MD  PCP: Kevin Posada MD                             Plan of care signed (Y/N):     Date of Patient follow up with Physician: 2023     Progress Report/POC: NO  POC update due: (10 visits [address] [place], whichever is less)  2023     In sling at all times except to bathe and do HEP x3 weeks, passive elbow flexion x3 weeks then active only x3 weeks no biceps resistance for 6 weeks. Shoulder AAROM 90 flex and 0 ER for 2 weeks    Preferred Language for Healthcare:   [x]English       []other:    SUBJECTIVE EXAMINATION     Patient Report/Comments:   1+ weeks post op  He reports that he saw Dr Jodi Smith yesterday and he reviewed his post op findings. He has been doing his HEP 3x/day, not really using ice. He is staying in his sling as prescribed. Really doesn't have much pain.                 Test used Initial score  2023   Pain Summary VAS 3-6    Functional questionnaire Upper Extremity functional Scale 100% disability    Other:                OBJECTIVE EXAMINATION       ROM PROM AROM  Comment

## 2023-12-11 ENCOUNTER — TREATMENT (OUTPATIENT)
Dept: PHYSICAL THERAPY | Age: 58
End: 2023-12-11
Payer: COMMERCIAL

## 2023-12-11 DIAGNOSIS — M25.511 ACUTE POSTOPERATIVE PAIN OF RIGHT SHOULDER: Primary | ICD-10-CM

## 2023-12-11 DIAGNOSIS — M75.101 NONTRAUMATIC TEAR OF RIGHT ROTATOR CUFF, UNSPECIFIED TEAR EXTENT: ICD-10-CM

## 2023-12-11 DIAGNOSIS — M75.21 BICEPS TENDONITIS ON RIGHT: ICD-10-CM

## 2023-12-11 DIAGNOSIS — G89.18 ACUTE POSTOPERATIVE PAIN OF RIGHT SHOULDER: Primary | ICD-10-CM

## 2023-12-11 PROCEDURE — 97110 THERAPEUTIC EXERCISES: CPT | Performed by: PHYSICAL THERAPIST

## 2023-12-11 PROCEDURE — 97530 THERAPEUTIC ACTIVITIES: CPT | Performed by: PHYSICAL THERAPIST

## 2023-12-11 NOTE — PROGRESS NOTES
IR   NT T9       Elbow Flex   WNL WNL       Elbow Ext   -5 WNL             Strength: deferred secondary to surgery L R Comment         Special Tests Results/Comment: deferred secondary to surgery         Therapeutic Ex (40884) Sets/sec Reps Notes/CUES   AD UE BIKE            STRETCHING/ROM      Pulleys      Table Slides-Flexion 5\" x15    Table Slides-Scaption 5\" x15    Table Slides-Abduction      Walk back stretch at counter      Wand-ER at 0 5\" x15 GENTLE   Wand-Supine ER at 45      Wand-Supine Flexion      UE Anatone      Pendulum 3 ways x15    Doorway      Wall Slides      CBA      TP BB      Sleeper       Elbow PROM/x15     Wrist AROM flex/ext x15           ISOMETRICS      Gripping x3'  Red ball   Retraction      Abduction      Flexion      Internal Rotation      External Rotation            STRENGTHENING-PREs      Flexion      Abduction      Internal Rotation      External Rotation      Shrugs x15     Biceps      Triceps      Retraction x15     Extension      Horizontal Abduction in ER      Serratus                        THERABANDS/CABLE COLUMN      Rows      Lats      Extension      Internal Rotation      External Rotation      Biceps      Triceps      PNF                                    Manual Intervention (99377)      Scar Massage      STM      Hawkgrips      GH joint mobilizations      Foamroll      PROM x5'  Gentle shoulder ER with arm at side and elbow extension into full range         NMR re-education (18135)   CUES NEEDED   Plyoback      Therabar oscillations      Body Blade       Rhythmic Stabilization      Ball on the wall                              Therapeutic Activity (54021)      Core training      Swiss ball activities      Education x25'  Extensive education on post op findings, anatomy and pathology. Reviewed rehab protocol and longer term expectations. Also discussed timing for return to driving and return to work.     Also advised on sling wearing and issued written HEP/[employer]

## 2023-12-21 ENCOUNTER — OFFICE VISIT (OUTPATIENT)
Dept: ORTHOPEDIC SURGERY | Age: 58
End: 2023-12-21

## 2023-12-21 VITALS — HEIGHT: 72 IN | WEIGHT: 240 LBS | BODY MASS INDEX: 32.51 KG/M2

## 2023-12-21 DIAGNOSIS — Z98.890 S/P RIGHT ROTATOR CUFF REPAIR: Primary | ICD-10-CM

## 2023-12-21 PROCEDURE — 99024 POSTOP FOLLOW-UP VISIT: CPT | Performed by: ORTHOPAEDIC SURGERY

## 2023-12-21 NOTE — PROGRESS NOTES
[address]  History and Physical  Shoulder Pain    Date:  2023    Name:  Briana Phillip  [facility]    :  1965      Age:   66 y.o.    SSN:  [id number]      Medical Record Number:  [id number]    Reason for Visit:    Shoulder Pain (F/U RIGHT SHOULDER)      HPI:   Briana Phillip is a 66 y.o. male who presents to our office today for 3-week follow-up of right shoulder arthroscopy with rotator cuff repair and open biceps tenodesis by Dr. Revonda Ormond. The patient reports he is doing extremely well and his pain has been well-controlled since that time. He is going to physical therapy 3 times a week and is mostly doing passive exercises. He has no complaints at this time. Pain Assessment  Location Modifiers: Right  Severity of Pain: 1  Quality of Pain: Dull  Limiting Behavior: Yes  Relieving Factors: Rest  Work-Related Injury: No  Are there other pain locations you wish to document?: No    No notes on file    Review of Systems:  A 14 point review of systems available in the scanned medical record as documented by the patient and reviewed on 2023. The review is negative with the exception of those things mentioned in the History of Present Illness and Past Medical History. Past Medical History:  Patient's medications, allergies, past medical, surgical, social and family histories were reviewed and updated as appropriate. Allergies: Allergies   Allergen Reactions    Cloves, Oil Of [Clove Oil] Hives     CLOVE CIGARETTE-HIVES    Crestor [Rosuvastatin] Myalgia    Seasonal      Hay fever ,    Simvastatin Swelling and Other (See Comments)     Myalgias severe  Other reaction(s): Other (See Comments)  Leg pain  DENIES SWELLING       Physical Exam:  There were no vitals filed for this visit.   General: Briana Phillip is a healthy and well appearing 66 y.o. male who is sitting comfortably in our office in no acute

## 2023-12-28 ENCOUNTER — TREATMENT (OUTPATIENT)
Dept: PHYSICAL THERAPY | Age: 58
End: 2023-12-28
Payer: COMMERCIAL

## 2023-12-28 DIAGNOSIS — M25.511 ACUTE POSTOPERATIVE PAIN OF RIGHT SHOULDER: Primary | ICD-10-CM

## 2023-12-28 DIAGNOSIS — M75.21 BICEPS TENDONITIS ON RIGHT: ICD-10-CM

## 2023-12-28 DIAGNOSIS — G89.18 ACUTE POSTOPERATIVE PAIN OF RIGHT SHOULDER: Primary | ICD-10-CM

## 2023-12-28 PROCEDURE — 97530 THERAPEUTIC ACTIVITIES: CPT | Performed by: PHYSICAL THERAPIST

## 2023-12-28 PROCEDURE — 97110 THERAPEUTIC EXERCISES: CPT | Performed by: PHYSICAL THERAPIST

## 2023-12-28 PROCEDURE — 97140 MANUAL THERAPY 1/> REGIONS: CPT | Performed by: PHYSICAL THERAPIST

## 2023-12-28 NOTE — PROGRESS NOTES
adjustment due to lack of progress  [] Patient is not progressing as expected and requires additional follow up with physician  [] Other:     CHARGE CAPTURE     PT CHARGE GRID   CPT Code (TIMED) minutes # CPT Code (UNTIMED) #     Therex (97883)  x15' 1  EVAL:LOW (38033 - Typically 20 minutes face-to-face)     Neuromusc. Re-ed (52473)    Re-Eval (86293)     Manual (05007) x10' 1  Estim Unattended (65839)     Ther. Act (22039) x15' 1  Mech. Traction ([id number])     Gait (88280)    Dry Needle 1-2 muscle (47258)     Aquatic Therex (76208)    Dry Needle 3+ muscle (23273)     Iontophoresis (60809)    VASO (33139)     Ultrasound (16182)    Group Therapy (47181)     Estim Attended (82901)    Canalith Repositioning (40890)     Other:    Other: Total Timed Code Tx Minutes 40' 3       Total Treatment Minutes 55        Charge Justification:  (76780) THERAPEUTIC EXERCISE - Provided verbal/tactile cueing for activities related to strengthening, flexibility, endurance, ROM performed to prevent loss of range of motion, maintain or improve muscular strength or increase flexibility, following either an injury or surgery. (31415) [facility] - Reviewed/Progressed HEP activities related to strengthening, flexibility, endurance, ROM performed to prevent loss of range of motion, maintain or improve muscular strength or increase flexibility, following either an injury or surgery.   (23071) NEUROMUSCULAR RE-EDUCATION - Therapeutic procedure, 1 or more areas, each 15 minutes; neuromuscular reeducation of movement, balance, coordination, kinesthetic sense, posture, and/or proprioception for sitting and/or standing activities  (17451) [address] [facility] - Reviewed/Progressed HEP activities related to neuromuscular reeducation of movement, balance, coordination, kinesthetic sense, posture, and/or proprioception for sitting and/or standing activities    (73080) THERAPEUTIC ACTIVITY - use of dynamic activities to improve functional

## 2024-01-02 ENCOUNTER — TELEPHONE (OUTPATIENT)
Dept: ORTHOPEDIC SURGERY | Age: 59
End: 2024-01-02

## 2024-01-15 ENCOUNTER — TREATMENT (OUTPATIENT)
Dept: PHYSICAL THERAPY | Age: 59
End: 2024-01-15

## 2024-01-15 DIAGNOSIS — G89.18 ACUTE POSTOPERATIVE PAIN OF RIGHT SHOULDER: Primary | ICD-10-CM

## 2024-01-15 DIAGNOSIS — M25.511 ACUTE POSTOPERATIVE PAIN OF RIGHT SHOULDER: Primary | ICD-10-CM

## 2024-01-15 DIAGNOSIS — M75.21 BICEPS TENDONITIS ON RIGHT: ICD-10-CM

## 2024-01-15 DIAGNOSIS — M75.101 NONTRAUMATIC TEAR OF RIGHT ROTATOR CUFF, UNSPECIFIED TEAR EXTENT: ICD-10-CM

## 2024-01-15 PROCEDURE — 97112 NEUROMUSCULAR REEDUCATION: CPT | Performed by: PHYSICAL THERAPIST

## 2024-01-15 PROCEDURE — 97110 THERAPEUTIC EXERCISES: CPT | Performed by: PHYSICAL THERAPIST

## 2024-01-15 NOTE — PROGRESS NOTES
[facility]            [address] [place] [address]              [phone]  F:[phone]      Physical Therapy: TREATMENT/PROGRESS NOTE   Patient: Ravindra Monzon (58 y.o. male)   Treatment Date: 01/15/2024   :  1965 MRN: [id number]   Visit #: 6   Insurance Allowable Auth Needed   90 []Yes    [x]No    Insurance: Payor: / No coverage found.  Insurance ID: No Subscriber Number on File  Secondary Insurance (if applicable):    Treatment Diagnosis:     ICD-10-CM    1. Acute postoperative pain of right shoulder  G89.18     M25.511       2. Biceps tendonitis on right  M75.21       3. Nontraumatic tear of right rotator cuff, unspecified tear extent  M75.101          Medical Diagnosis:    PROCEDURE:   s/p right RTC repair with OBT  DOS:   2023   Referring Physician: No ref. provider found  PCP: Bowen Bryan MD                             Plan of care signed (Y/N):     Date of Patient follow up with Physician:      Progress Report/POC: NO  POC update due: (10 visits /OR AUTH LIMITS, whichever is less)  2023     In sling at all times except to bathe and do HEP x3 weeks, passive elbow flexion x3 weeks then active only x3 weeks no biceps resistance for 6 weeks.   Shoulder AAROM 90 flex and 0 ER for 2 weeks    Preferred Language for Healthcare:   [x]English       []other:    SUBJECTIVE EXAMINATION     Patient Report/Comments:   7 weeks post op  He has been getting more used to his arm being out of his sling.     He has returned to work and is being careful not to use his left UE.     He feels like his ROM with the exercises is coming along.    Still feels a bit tight into end ranges and really still notices ER with the wand.                  Test used Initial score  12/1/23 01/15/2024   Pain Summary VAS 3-6 3   Functional questionnaire Upper Extremity functional Scale 100% disability    Other:                OBJECTIVE EXAMINATION

## 2024-01-18 ENCOUNTER — TELEPHONE (OUTPATIENT)
Dept: PHYSICAL THERAPY | Age: 59
End: 2024-01-18

## 2024-01-18 NOTE — TELEPHONE ENCOUNTER
Patient called this afternoon and left a message.   I just called him back.   He is a little concerned that he is feeling some discomfort now in his shoulder when he really hasn't experienced any pain lately.   He denies any trauma.   No slips/falls, no lifting heavy objects.   He has been keeping up with his HEP.  I discussed with him that we progressed a lot with his exercises this week and I would expect him to feel it some.    As long as he is being careful at home and work, he is doing ok.   Advised him to rest and ice when he is more sore.   He was ok with this and I will see him on the 29th for a follow up.    He can call sooner if needed.    Semaj Reyes, PT      Board Certified Orthopaedic Clinical Specialist  TPI Certified  Physical Therapist    [place] PT #[id number]  [place] PT #[id number]]

## 2024-01-21 RX ORDER — AMLODIPINE BESYLATE 5 MG/1
5 TABLET ORAL DAILY
Qty: 30 TABLET | Refills: 5 | Status: SHIPPED | OUTPATIENT
Start: 2024-01-21

## 2024-01-23 ENCOUNTER — TELEPHONE (OUTPATIENT)
Dept: PHYSICAL THERAPY | Age: 59
End: 2024-01-23

## 2024-01-23 NOTE — TELEPHONE ENCOUNTER
Patient called back again and stated he is still having pain in his shoulder.    He now realizes that he did do something he probably shouldn't have.   He was pushing a probe in the ground for work.   He did this about 10 times pushing/pulling it in and out of the ground.   He states he did not have pain during this activity.   But noticed it 2 days later.   This was last week.   He is concerned that he is still feeling it.     Advised him to rest from any exercise that causes discomfort, use a lot of ice and take it easy this week.    He has a follow up visit with me on 1/29 and we will reassess at that time.   He was satisfied with this and will be very careful going forward with activity.    Semaj Reyes, PT      Board Certified Orthopaedic Clinical Specialist  TPI Certified  Physical Therapist    [place] PT #[id number]  [place] PT #[id number]

## 2024-01-29 ENCOUNTER — TREATMENT (OUTPATIENT)
Dept: PHYSICAL THERAPY | Age: 59
End: 2024-01-29

## 2024-01-29 DIAGNOSIS — G89.18 ACUTE POSTOPERATIVE PAIN OF RIGHT SHOULDER: Primary | ICD-10-CM

## 2024-01-29 DIAGNOSIS — M25.511 ACUTE POSTOPERATIVE PAIN OF RIGHT SHOULDER: Primary | ICD-10-CM

## 2024-01-29 DIAGNOSIS — M75.21 BICEPS TENDONITIS ON RIGHT: ICD-10-CM

## 2024-01-29 DIAGNOSIS — M75.101 NONTRAUMATIC TEAR OF RIGHT ROTATOR CUFF, UNSPECIFIED TEAR EXTENT: ICD-10-CM

## 2024-01-29 PROCEDURE — 97110 THERAPEUTIC EXERCISES: CPT | Performed by: PHYSICAL THERAPIST

## 2024-01-29 PROCEDURE — 97112 NEUROMUSCULAR REEDUCATION: CPT | Performed by: PHYSICAL THERAPIST

## 2024-01-29 NOTE — PROGRESS NOTES
[place] [facility]            [address] [place] [address]              P:[phone]  F:[phone]      Physical Therapy: TREATMENT/PROGRESS NOTE   Patient: Ravindra Monzon (58 y.o. male)   Treatment Date: 2024   :  1965 MRN: [id number]   Visit #: 7   Insurance Allowable Auth Needed   90 []Yes    [x]No    Insurance: Payor: / No coverage found.  Insurance ID: No Subscriber Number on File  Secondary Insurance (if applicable):    Treatment Diagnosis:     ICD-10-CM    1. Acute postoperative pain of right shoulder  G89.18     M25.511       2. Biceps tendonitis on right  M75.21       3. Nontraumatic tear of right rotator cuff, unspecified tear extent  M75.101          Medical Diagnosis:    PROCEDURE:   s/p right RTC repair with OBT  DOS:   2023   Referring Physician: Husam Montelongo MD  PCP: Bowen Bryan MD                             Plan of care signed (Y/N):     Date of Patient follow up with Physician:      Progress Report/POC: NO  POC update due: (10 visits /OR AUTH LIMITS, whichever is less)  2023     In sling at all times except to bathe and do HEP x3 weeks, passive elbow flexion x3 weeks then active only x3 weeks no biceps resistance for 6 weeks.   Shoulder AAROM 90 flex and 0 ER for 2 weeks    Preferred Language for Healthcare:   [x]English       []other:    SUBJECTIVE EXAMINATION     Patient Report/Comments:   9 weeks post op  Patient reports that he has had a bit of a setback over the past 1-2 weeks.    He has been working and really trying not to use his right UE but at times finds himself doing some activities with the right arm.     He states that he has backed off on his exercises over the past week.   And each day his shoulder has been feeling a little better.                  Test used Initial score  2024   Pain Summary VAS 3-6 3   Functional questionnaire Upper Extremity functional Scale 100% disability

## 2024-02-05 ENCOUNTER — TELEPHONE (OUTPATIENT)
Dept: ORTHOPEDIC SURGERY | Age: 59
End: 2024-02-05

## 2024-02-05 NOTE — TELEPHONE ENCOUNTER
General Question     Subject: SOONER APPT  Patient and /or Facility Request: Ravindra Monzon  Contact Number: [phone]    PT REQ TO BE SEEN SOONER THE 2/22 DECLINED PA WILL GO TO ANY OFFICE  PLEASE CLL TO ADV

## 2024-02-06 ENCOUNTER — OFFICE VISIT (OUTPATIENT)
Dept: ORTHOPEDIC SURGERY | Age: 59
End: 2024-02-06

## 2024-02-06 VITALS — WEIGHT: 240 LBS | BODY MASS INDEX: 32.51 KG/M2 | HEIGHT: 72 IN

## 2024-02-06 DIAGNOSIS — Z98.890 S/P RIGHT ROTATOR CUFF REPAIR: Primary | ICD-10-CM

## 2024-02-06 PROCEDURE — 99024 POSTOP FOLLOW-UP VISIT: CPT | Performed by: ORTHOPAEDIC SURGERY

## 2024-02-06 RX ORDER — PREDNISONE 10 MG/1
TABLET ORAL
Qty: 35 TABLET | Refills: 0 | Status: SHIPPED | OUTPATIENT
Start: 2024-02-06

## 2024-02-06 NOTE — PROGRESS NOTES
[facility]  History and Physical  Shoulder Pain    Date:  2024    Name:  Ravindra Monzon  Address:  [address] [address]    :  1965      Age:   58 y.o.    SSN:  [id number]      Medical Record Number:  [id number]    Reason for Visit:    Shoulder Pain (F/U RIGHT SHOULDER)      HPI:   Ravindra Monzon is a 58 y.o. male who presents to our office today for follow up.  He is 10 weeks status post a right shoulder arthroscopy with rotator cuff repair and open biceps tenodesis.  Surgery was on 2023. The patient reports he was doing well until the  when he had sudden episode of a sharp pain when he went to reach for toilet paper.  The weeks leading up to that event the patient reports he did do some things at work that included pushing and pulling a probe into the ground while he was at work.  He did not notice any pain or soreness during that process or the days following that event.  He opted to take a week off of therapy and then when he returned back he was doing quite well until the weekend again he started to have another episode of sharp pain.      Pain Assessment  Location of Pain: Shoulder  Location Modifiers: Right  Severity of Pain: 2  Quality of Pain: Sharp, Throbbing  Duration of Pain: Persistent  Frequency of Pain: Constant  Aggravating Factors: Other (Comment), Straightening, Stretching, Exercise  Limiting Behavior: Yes  Relieving Factors: Rest, Exercise  Work-Related Injury: No  Are there other pain locations you wish to document?: No    Review of Systems:  A 14 point review of systems available in the scanned medical record as documented by the patient and reviewed on 2024.  The review is negative with the exception of those things mentioned in the History of Present Illness and Past Medical History.      Past Medical History:  Patient's medications, allergies, past medical, surgical, social and family histories

## 2024-02-08 ENCOUNTER — TREATMENT (OUTPATIENT)
Dept: PHYSICAL THERAPY | Age: 59
End: 2024-02-08
Payer: COMMERCIAL

## 2024-02-08 DIAGNOSIS — M75.101 NONTRAUMATIC TEAR OF RIGHT ROTATOR CUFF, UNSPECIFIED TEAR EXTENT: ICD-10-CM

## 2024-02-08 DIAGNOSIS — M75.21 BICEPS TENDONITIS ON RIGHT: ICD-10-CM

## 2024-02-08 DIAGNOSIS — G89.18 ACUTE POSTOPERATIVE PAIN OF RIGHT SHOULDER: Primary | ICD-10-CM

## 2024-02-08 DIAGNOSIS — M25.511 ACUTE POSTOPERATIVE PAIN OF RIGHT SHOULDER: Primary | ICD-10-CM

## 2024-02-08 PROCEDURE — 97110 THERAPEUTIC EXERCISES: CPT | Performed by: PHYSICAL THERAPIST

## 2024-02-08 PROCEDURE — 97112 NEUROMUSCULAR REEDUCATION: CPT | Performed by: PHYSICAL THERAPIST

## 2024-02-08 NOTE — PROGRESS NOTES
[facility]            [address] [place] [address]              P:[phone]  F:[phone]      Physical Therapy: TREATMENT/PROGRESS NOTE   Patient: Ravindra Monzon (58 y.o. male)   Treatment Date: 2024   :  1965 MRN: [id number]   Visit #: 8   Insurance Allowable Auth Needed   90 []Yes    [x]No    Insurance: Payor: AMBETTER / Plan: AMBETTER / Product Type: *No Product type* /   Insurance ID: [id number] - (Commercial)  Secondary Insurance (if applicable):    Treatment Diagnosis:     ICD-10-CM    1. Acute postoperative pain of right shoulder  G89.18     M25.511       2. Biceps tendonitis on right  M75.21       3. Nontraumatic tear of right rotator cuff, unspecified tear extent  M75.101          Medical Diagnosis:    PROCEDURE:   s/p right RTC repair with OBT  DOS:   2023   Referring Physician: Husam Montelongo MD  PCP: Bowen Bryan MD                             Plan of care signed (Y/N):     Date of Patient follow up with Physician: ~3 weeks from last session     Progress Report/POC: NO  POC update due: (10 visits /OR AUTH LIMITS, whichever is less)  2023     In sling at all times except to bathe and do HEP x3 weeks, passive elbow flexion x3 weeks then active only x3 weeks no biceps resistance for 6 weeks.   Shoulder AAROM 90 flex and 0 ER for 2 weeks    Preferred Language for Healthcare:   [x]English       []other:    SUBJECTIVE EXAMINATION     Patient Report/Comments:   10+ weeks post op  Patient saw Dr Montelongo earlier this week for a follow up.     They did prescribe him a 15 day dose of prednisone, he is on his 4th day.     He still gets occasional \"spikes\" of pain with certain movements.    He is concerned and not sure what to do, should he push through or back off.   He hasn't done any exercises in 3 days.              Test used Initial score  2024   Pain Summary VAS 3-6 3 up to 8   Functional

## 2024-02-20 ENCOUNTER — TREATMENT (OUTPATIENT)
Dept: PHYSICAL THERAPY | Age: 59
End: 2024-02-20

## 2024-02-20 DIAGNOSIS — M75.101 NONTRAUMATIC TEAR OF RIGHT ROTATOR CUFF, UNSPECIFIED TEAR EXTENT: ICD-10-CM

## 2024-02-20 DIAGNOSIS — M75.21 BICEPS TENDONITIS ON RIGHT: ICD-10-CM

## 2024-02-20 DIAGNOSIS — M25.511 ACUTE POSTOPERATIVE PAIN OF RIGHT SHOULDER: Primary | ICD-10-CM

## 2024-02-20 DIAGNOSIS — G89.18 ACUTE POSTOPERATIVE PAIN OF RIGHT SHOULDER: Primary | ICD-10-CM

## 2024-02-20 PROCEDURE — 97112 NEUROMUSCULAR REEDUCATION: CPT | Performed by: PHYSICAL THERAPIST

## 2024-02-20 PROCEDURE — 97110 THERAPEUTIC EXERCISES: CPT | Performed by: PHYSICAL THERAPIST

## 2024-02-20 NOTE — PROGRESS NOTES
Extremity functional Scale 100% disability    Other:                OBJECTIVE EXAMINATION       ROM PROM AROM  Comment     L R L R 2/20/2024   Flexion   120 170  ~120     Abduction     170       ER@ 45   50         IR/BB    T9  L3-4     Elbow Flex    WNL  WNL     Elbow Ext    WNL  WNL           Strength: deferred secondary to surgery L R Comment         Special Tests Results/Comment: deferred secondary to surgery         Therapeutic Ex (12273) Sets/sec Reps Notes/CUES   AD UE BIKE            STRETCHING/ROM      Pulleys x4'     Table Slides-Flexion 5\" x10    Table Slides-Scaption 5\" x10    Table Slides-ER 5\" x10    Walk back stretch at counter      Wand-ER at 0 5\" x15    Wand-Supine ER at 45 5\" x15    Wand-Supine Flexion 5\" x15    UE [place]      Pendulum 3 ways x15 prn   Doorway         TP BB 10\" x10 Pulling across/up   Sleeper            3# in hand         ISOMETRICS      Retraction      Abduction      Flexion      Internal Rotation 5\" x15    External Rotation 5\" x15          STRENGTHENING-PREs      Flexion      Abduction      Internal Rotation      External Rotation          Hold for now   Triceps          Extension      Horizontal Abduction in ER      Serratus                        THERABANDS/CABLE COLUMN      Hold for now   Lats      Extension      Internal Rotation       External Rotation      Biceps      Triceps      PNF                                    Manual Intervention (41297)      Scar Massage      STM      Hawkgrips      GH joint mobilizations      Foamroll      PROM x5'  Gentle shoulder PROM all planes         NMR re-education (32410)   CUES NEEDED   Plyoback      Therabar oscillations      Body Blade       Rhythmic Stabilization      Ball on the wall      Sidelying scap clocks X20 each  12/6 and 3/9                     Therapeutic Activity (56147)      Core training      Swiss ball activities      Education x25'  Extensive education on post op findings, anatomy and pathology.   Reviewed rehab

## 2024-03-07 ENCOUNTER — TELEPHONE (OUTPATIENT)
Dept: ORTHOPEDIC SURGERY | Age: 59
End: 2024-03-07

## 2024-03-07 ENCOUNTER — OFFICE VISIT (OUTPATIENT)
Dept: ORTHOPEDIC SURGERY | Age: 59
End: 2024-03-07
Payer: COMMERCIAL

## 2024-03-07 VITALS — HEIGHT: 72 IN | BODY MASS INDEX: 32.51 KG/M2 | WEIGHT: 240 LBS

## 2024-03-07 DIAGNOSIS — Z98.890 S/P RIGHT ROTATOR CUFF REPAIR: Primary | ICD-10-CM

## 2024-03-07 PROCEDURE — 99213 OFFICE O/P EST LOW 20 MIN: CPT | Performed by: ORTHOPAEDIC SURGERY

## 2024-03-11 ENCOUNTER — TREATMENT (OUTPATIENT)
Dept: PHYSICAL THERAPY | Age: 59
End: 2024-03-11

## 2024-03-11 DIAGNOSIS — M25.511 ACUTE POSTOPERATIVE PAIN OF RIGHT SHOULDER: Primary | ICD-10-CM

## 2024-03-11 DIAGNOSIS — G89.18 ACUTE POSTOPERATIVE PAIN OF RIGHT SHOULDER: Primary | ICD-10-CM

## 2024-03-11 DIAGNOSIS — M75.21 BICEPS TENDONITIS ON RIGHT: ICD-10-CM

## 2024-03-11 DIAGNOSIS — M75.101 NONTRAUMATIC TEAR OF RIGHT ROTATOR CUFF, UNSPECIFIED TEAR EXTENT: ICD-10-CM

## 2024-03-11 PROCEDURE — 97110 THERAPEUTIC EXERCISES: CPT | Performed by: PHYSICAL THERAPIST

## 2024-03-11 PROCEDURE — 97112 NEUROMUSCULAR REEDUCATION: CPT | Performed by: PHYSICAL THERAPIST

## 2024-03-11 NOTE — PROGRESS NOTES
reeducation of movement, balance, coordination, kinesthetic sense, posture, and/or proprioception for sitting and/or standing activities    (12050) THERAPEUTIC ACTIVITY - use of dynamic activities to improve functional performance. (Ex include squatting, ascending/descending stairs, walking, bending, lifting, catching, throwing, pushing, pulling, jumping.)  Direct, one on one contact, billed in 15-minute increments.  (71393) MANUAL THERAPY -  Manual therapy techniques, 1 or more regions, each 15 minutes (Mobilization/manipulation, manual lymphatic drainage, manual traction) for the purpose of modulating pain, promoting relaxation,  increasing ROM, reducing/eliminating soft tissue swelling/inflammation/restriction, improving soft tissue extensibility and allowing for proper ROM for normal function with self care, mobility, lifting and ambulation    TREATMENT PLAN   Plan: Cont POC- Continue emphasis/focus on exercise progression, improving proper muscle recruitment and activation/motor control patterns, modulating pain, promoting relaxation, increasing ROM, reduce/eliminate soft tissue swelling/inflammation/restriction, improving soft tissue extensibility, and allowing for proper ROM. Next visit plan to continue max protect phase    He is limited in terms of how much he can come in due to his insurance.  Follow up in 3-4 weeks and progress to more advanced strengthening.    Electronically Signed by Semaj Reyes PT              Date: 03/11/2024      Board Certified Orthopaedic Clinical Specialist  TPI Certified  Physical Therapist    [place] PT #[id number]  [place] PT #[id number]       Note: If patient does not return for scheduled/recommended follow up visits, this note will serve as a discharge from care along with the most recent update on progress.

## 2024-03-19 NOTE — PROGRESS NOTES
History of Present Illness:  Ravindra Monzon returns for follow-up of his right shoulder. He is now 3 1/2 months out following arthroscopic rotator cuff repair and biceps tenodesis. He is pleased with his progress. He still has pain and slight weakness. He has been working in PT at the [facility] office and has no complaints. He is pleased with his decision to undergo the repair and reports that he is 90% better than before surgery.       Medical History:  Patient's medications, allergies, past medical, surgical, social and family histories were reviewed and updated as appropriate.    Pertinent items are noted in HPI  Review of systems reviewed from Patient History Form dated on 11/27/23 and available in the patient's chart under the Media tab.     Vital Signs:  There were no vitals filed for this visit.  Constitutional: in no distress.      Right Shoulder Examination:    Inspection:  [facility].    Palpation:  nontender. No crepitus.    Active Range of Motion: Full. IR to T12.    Passive Range of Motion:  WNL    Strength:  4-/5 ER at side. 4/5 Champagne Toast. IR 4/5.    Special Tests:  No Que muscle deformity.         Assessment :  Ravindra Mnozon is progressing well. He still has some rotator cuff strength deficits but these should improve over time.    Impression:  Encounter Diagnosis   Name Primary?    S/P right rotator cuff repair Yes       Office Procedures:  Orders Placed This Encounter   Procedures    [facility]     Referral Priority:   Routine     Referral Type:   Eval and Treat     Referral Reason:   Specialty Services Required     Requested Specialty:   Physical Therapist     Number of Visits Requested:   1       Treatment Plan:  We will have him continue with PT at the [facility]. A new prescription was provided today.    Follow-up in 6 weeks or as needed. He can cancel the visit if he is doing well. We will send a postop questionnaire at 6

## 2024-04-17 RX ORDER — LISINOPRIL 40 MG/1
40 TABLET ORAL DAILY
Qty: 90 TABLET | Refills: 1 | Status: SHIPPED | OUTPATIENT
Start: 2024-04-17

## 2024-04-22 RX ORDER — AMLODIPINE BESYLATE 5 MG/1
5 TABLET ORAL DAILY
Qty: 90 TABLET | Refills: 3 | Status: SHIPPED | OUTPATIENT
Start: 2024-04-22

## 2024-05-02 ENCOUNTER — TREATMENT (OUTPATIENT)
Dept: PHYSICAL THERAPY | Age: 59
End: 2024-05-02
Payer: COMMERCIAL

## 2024-05-02 DIAGNOSIS — G89.18 ACUTE POSTOPERATIVE PAIN OF RIGHT SHOULDER: Primary | ICD-10-CM

## 2024-05-02 DIAGNOSIS — M75.101 NONTRAUMATIC TEAR OF RIGHT ROTATOR CUFF, UNSPECIFIED TEAR EXTENT: ICD-10-CM

## 2024-05-02 DIAGNOSIS — M25.511 ACUTE POSTOPERATIVE PAIN OF RIGHT SHOULDER: Primary | ICD-10-CM

## 2024-05-02 DIAGNOSIS — M75.21 BICEPS TENDONITIS ON RIGHT: ICD-10-CM

## 2024-05-02 PROCEDURE — 97110 THERAPEUTIC EXERCISES: CPT | Performed by: PHYSICAL THERAPIST

## 2024-05-02 PROCEDURE — 97112 NEUROMUSCULAR REEDUCATION: CPT | Performed by: PHYSICAL THERAPIST

## 2024-05-02 NOTE — PROGRESS NOTES
[facility]            [address] [place] [address]              P:[phone]  F:[phone]      Physical Therapy: TREATMENT/PROGRESS NOTE   Patient: Ravindra Monzon (58 y.o. male)   Treatment Date: 2024   :  1965 MRN: [id number]   Visit #: 11   Insurance Allowable Auth Needed   90 []Yes    [x]No    Insurance: Payor: AMBETTER / Plan: AMBETTER / Product Type: *No Product type* /   Insurance ID: [id number] - (Commercial)  Secondary Insurance (if applicable):    Treatment Diagnosis:     ICD-10-CM    1. Acute postoperative pain of right shoulder  G89.18     M25.511       2. Biceps tendonitis on right  M75.21       3. Nontraumatic tear of right rotator cuff, unspecified tear extent  M75.101          Medical Diagnosis:    PROCEDURE:   s/p right RTC repair with OBT  DOS:   2023   Referring Physician: Husam Montelongo MD  PCP: Bowen Bryan MD                             Plan of care signed (Y/N):     Date of Patient follow up with Physician:      Progress Report/POC: NO  POC update due: (10 visits /OR AUTH LIMITS, whichever is less)  2023       Preferred Language for Healthcare:   [x]English       []other:    SUBJECTIVE EXAMINATION     Patient Report/Comments:   5+ months post op  He has been feeling good.    Has been progressing his strengthening work on his own.  Up to 10 # for biceps.   Not doing any overhead lifting.   He is still working on some stretching exercises as well.             Test used Initial score  2024   Pain Summary VAS 3-6 3 up to 8   Functional questionnaire Upper Extremity functional Scale 100% disability    Other:                OBJECTIVE EXAMINATION       ROM PROM AROM  Comment     L R L R 2024   Flexion     WNL Slight end range tightness     Abduction    WNL WNL     ER@ 45            IR/BB    T8  T10     Elbow Flex    WNL  WNL     Elbow Ext    WNL  WNL           Strength:

## 2024-08-15 ENCOUNTER — OFFICE VISIT (OUTPATIENT)
Dept: FAMILY MEDICINE CLINIC | Age: 59
End: 2024-08-15

## 2024-08-15 ENCOUNTER — HOSPITAL ENCOUNTER (OUTPATIENT)
Dept: GENERAL RADIOLOGY | Age: 59
Discharge: HOME | End: 2024-08-15
Payer: COMMERCIAL

## 2024-08-15 VITALS
HEART RATE: 70 BPM | TEMPERATURE: 97.5 F | DIASTOLIC BLOOD PRESSURE: 88 MMHG | OXYGEN SATURATION: 97 % | BODY MASS INDEX: 32.23 KG/M2 | WEIGHT: 238 LBS | HEIGHT: 72 IN | SYSTOLIC BLOOD PRESSURE: 140 MMHG

## 2024-08-15 DIAGNOSIS — Z12.5 SCREENING FOR PROSTATE CANCER: ICD-10-CM

## 2024-08-15 DIAGNOSIS — G89.29 CHRONIC LEFT-SIDED LOW BACK PAIN WITH LEFT-SIDED SCIATICA: ICD-10-CM

## 2024-08-15 DIAGNOSIS — M54.32 LEFT SIDED SCIATICA: ICD-10-CM

## 2024-08-15 DIAGNOSIS — Z12.11 SCREEN FOR COLON CANCER: ICD-10-CM

## 2024-08-15 DIAGNOSIS — Z00.00 ROUTINE GENERAL MEDICAL EXAMINATION AT A HEALTH CARE FACILITY: ICD-10-CM

## 2024-08-15 DIAGNOSIS — M54.42 CHRONIC LEFT-SIDED LOW BACK PAIN WITH LEFT-SIDED SCIATICA: ICD-10-CM

## 2024-08-15 DIAGNOSIS — R73.9 HYPERGLYCEMIA: ICD-10-CM

## 2024-08-15 DIAGNOSIS — M75.121 NONTRAUMATIC COMPLETE TEAR OF RIGHT ROTATOR CUFF: ICD-10-CM

## 2024-08-15 DIAGNOSIS — E55.9 VITAMIN D DEFICIENCY: ICD-10-CM

## 2024-08-15 DIAGNOSIS — E78.00 HYPERCHOLESTEROLEMIA: ICD-10-CM

## 2024-08-15 DIAGNOSIS — R20.8 DECREASED SENSATION: ICD-10-CM

## 2024-08-15 DIAGNOSIS — Z00.00 ROUTINE GENERAL MEDICAL EXAMINATION AT A HEALTH CARE FACILITY: Primary | ICD-10-CM

## 2024-08-15 DIAGNOSIS — N02.B9 IGA NEPHROPATHY: ICD-10-CM

## 2024-08-15 DIAGNOSIS — I10 ESSENTIAL HYPERTENSION: ICD-10-CM

## 2024-08-15 LAB
ALBUMIN SERPL-MCNC: 4.4 G/DL (ref 3.4–5)
ALBUMIN/GLOB SERPL: 2 {RATIO} (ref 1.1–2.2)
ALP SERPL-CCNC: 44 U/L (ref 40–129)
ALT SERPL-CCNC: 23 U/L (ref 10–40)
ANION GAP SERPL CALCULATED.3IONS-SCNC: 12 MMOL/L (ref 3–16)
AST SERPL-CCNC: 25 U/L (ref 15–37)
BASOPHILS # BLD: 0 K/UL (ref 0–0.2)
BASOPHILS NFR BLD: 0.6 %
BILIRUB SERPL-MCNC: 0.7 MG/DL (ref 0–1)
BILIRUB UR QL STRIP.AUTO: NEGATIVE
BUN SERPL-MCNC: 14 MG/DL (ref 7–20)
CALCIUM SERPL-MCNC: 9.7 MG/DL (ref 8.3–10.6)
CHLORIDE SERPL-SCNC: 104 MMOL/L (ref 99–110)
CHOLEST SERPL-MCNC: 214 MG/DL (ref 0–199)
CLARITY UR: CLEAR
CO2 SERPL-SCNC: 24 MMOL/L (ref 21–32)
COLOR UR: YELLOW
CREAT SERPL-MCNC: 1 MG/DL (ref 0.9–1.3)
CREAT UR-MCNC: 57.6 MG/DL (ref 39–259)
DEPRECATED RDW RBC AUTO: 13.6 % (ref 12.4–15.4)
EOSINOPHIL # BLD: 0.1 K/UL (ref 0–0.6)
EOSINOPHIL NFR BLD: 1.6 %
EST. AVERAGE GLUCOSE BLD GHB EST-MCNC: 111.2 MG/DL
FOLATE SERPL-MCNC: 28.2 NG/ML (ref 4.78–24.2)
GFR SERPLBLD CREATININE-BSD FMLA CKD-EPI: 88 ML/MIN/{1.73_M2}
GLUCOSE SERPL-MCNC: 88 MG/DL (ref 70–99)
GLUCOSE UR STRIP.AUTO-MCNC: NEGATIVE MG/DL
HBA1C MFR BLD: 5.5 %
HCT VFR BLD AUTO: 42.5 % (ref 40.5–52.5)
HDLC SERPL-MCNC: 42 MG/DL (ref 40–60)
HGB BLD-MCNC: 14.2 G/DL (ref 13.5–17.5)
HGB UR QL STRIP.AUTO: NEGATIVE
KETONES UR STRIP.AUTO-MCNC: NEGATIVE MG/DL
LDLC SERPL CALC-MCNC: 160 MG/DL
LEUKOCYTE ESTERASE UR QL STRIP.AUTO: NEGATIVE
LYMPHOCYTES # BLD: 2.2 K/UL (ref 1–5.1)
LYMPHOCYTES NFR BLD: 33.2 %
MCH RBC QN AUTO: 30.7 PG (ref 26–34)
MCHC RBC AUTO-ENTMCNC: 33.3 G/DL (ref 31–36)
MCV RBC AUTO: 92.2 FL (ref 80–100)
MICROALBUMIN UR DL<=1MG/L-MCNC: 3.52 MG/DL
MICROALBUMIN/CREAT UR: 61.1 MG/G (ref 0–30)
MONOCYTES # BLD: 0.7 K/UL (ref 0–1.3)
MONOCYTES NFR BLD: 11.1 %
NEUTROPHILS # BLD: 3.5 K/UL (ref 1.7–7.7)
NEUTROPHILS NFR BLD: 53.5 %
NITRITE UR QL STRIP.AUTO: NEGATIVE
PH UR STRIP.AUTO: 6 [PH] (ref 5–8)
PLATELET # BLD AUTO: 316 K/UL (ref 135–450)
PLATELET BLD QL SMEAR: ADEQUATE
PMV BLD AUTO: 9.4 FL (ref 5–10.5)
POTASSIUM SERPL-SCNC: 4.9 MMOL/L (ref 3.5–5.1)
PROT SERPL-MCNC: 6.6 G/DL (ref 6.4–8.2)
PROT UR STRIP.AUTO-MCNC: NEGATIVE MG/DL
PSA SERPL DL<=0.01 NG/ML-MCNC: 0.54 NG/ML (ref 0–4)
RBC # BLD AUTO: 4.61 M/UL (ref 4.2–5.9)
RBC MORPH BLD: NORMAL
SLIDE REVIEW: NORMAL
SODIUM SERPL-SCNC: 140 MMOL/L (ref 136–145)
SP GR UR STRIP.AUTO: 1.01 (ref 1–1.03)
T4 FREE SERPL-MCNC: 1.5 NG/DL (ref 0.9–1.8)
TRIGL SERPL-MCNC: 59 MG/DL (ref 0–150)
TSH SERPL DL<=0.005 MIU/L-ACNC: 1.62 UIU/ML (ref 0.27–4.2)
UA DIPSTICK W REFLEX MICRO PNL UR: NORMAL
URN SPEC COLLECT METH UR: NORMAL
UROBILINOGEN UR STRIP-ACNC: 0.2 E.U./DL
VIT B12 SERPL-MCNC: 369 PG/ML (ref 211–911)
VLDLC SERPL CALC-MCNC: 12 MG/DL
WBC # BLD AUTO: 6.6 K/UL (ref 4–11)

## 2024-08-15 PROCEDURE — 72100 X-RAY EXAM L-S SPINE 2/3 VWS: CPT

## 2024-08-15 SDOH — ECONOMIC STABILITY: FOOD INSECURITY: WITHIN THE PAST 12 MONTHS, YOU WORRIED THAT YOUR FOOD WOULD RUN OUT BEFORE YOU GOT MONEY TO BUY MORE.: NEVER TRUE

## 2024-08-15 SDOH — ECONOMIC STABILITY: FOOD INSECURITY: WITHIN THE PAST 12 MONTHS, THE FOOD YOU BOUGHT JUST DIDN'T LAST AND YOU DIDN'T HAVE MONEY TO GET MORE.: NEVER TRUE

## 2024-08-15 SDOH — ECONOMIC STABILITY: INCOME INSECURITY: HOW HARD IS IT FOR YOU TO PAY FOR THE VERY BASICS LIKE FOOD, HOUSING, MEDICAL CARE, AND HEATING?: NOT HARD AT ALL

## 2024-08-15 ASSESSMENT — PATIENT HEALTH QUESTIONNAIRE - PHQ9
SUM OF ALL RESPONSES TO PHQ9 QUESTIONS 1 & 2: 0
SUM OF ALL RESPONSES TO PHQ QUESTIONS 1-9: 0
SUM OF ALL RESPONSES TO PHQ9 QUESTIONS 1 & 2: 0
SUM OF ALL RESPONSES TO PHQ QUESTIONS 1-9: 0
SUM OF ALL RESPONSES TO PHQ QUESTIONS 1-9: 0
1. LITTLE INTEREST OR PLEASURE IN DOING THINGS: NOT AT ALL
2. FEELING DOWN, DEPRESSED OR HOPELESS: NOT AT ALL
1. LITTLE INTEREST OR PLEASURE IN DOING THINGS: NOT AT ALL
SUM OF ALL RESPONSES TO PHQ QUESTIONS 1-9: 0
2. FEELING DOWN, DEPRESSED OR HOPELESS: NOT AT ALL

## 2024-08-15 NOTE — PROGRESS NOTES
Here for well checkup, physical.  Pt had surgery in 11/2023 for R shoulder and at this time is doing great.  Pt very happy with his progress.  Pt did physical therapy for about 4-5 months but feels doing great.  Pt continues to do home exercise program, stretching.      Pt is not exercising enough, is not as consistent.  Pt has adjusted diet successfully.  Pt is checking blood sugars regularly.  Pt does monitor blood sugars and seeing improvement in blood sugars, averaging 105.  Pt does eat out but does avoid carbs.  Pt does drink, but not daily.  Pt states that it is usually socialy, 3-4 beers when out and about.      Here for f/u of cholesterol.  Pt as been working on diet/exercise and is consistent with therapy.  No side effects from current therapy.  No chest pain, shortness of breath.  No numbness/tingling in extremities     Pt feels mood doing great, no issues at this time.     Pt has noted some mild increase in R flank pain, present since he had COVID a few months ago.  Does not find relationship with eating, diet, alcohol.  Sx are mild but worse with movement.  No urinary sx, no n/v.  Does have hx     Pt does have some chronic issues of R lower leg sciatica, that starts around L leg and down .   Pt strength seems mildly decrease.  No issues with neuropathy and no incontinence.  Pt is not treating except with some intermittent stretching    Except as noted in the history of present illness as above, the review of systems is negative for the following:    General ROS: negative  Psychological ROS: negative  Allergy and Immunology ROS: negative  Hematological and Lymphatic ROS: negative  Respiratory ROS: no cough, shortness of breath, or wheezing  Cardiovascular ROS: no chest pain or dyspnea on exertion  Gastrointestinal ROS: no abdominal pain, change in bowel habits, or black or bloody stools  Genito-Urinary ROS: no dysuria, trouble voiding, or hematuria  Musculoskeletal ROS: negative  Dermatological ROS:

## 2024-08-16 LAB — TESTOST SERPL-MCNC: 446 NG/DL (ref 193–740)

## 2024-09-03 ENCOUNTER — PATIENT MESSAGE (OUTPATIENT)
Dept: FAMILY MEDICINE CLINIC | Age: 59
End: 2024-09-03

## 2024-09-03 DIAGNOSIS — N02.B9 IGA NEPHROPATHY: Primary | ICD-10-CM

## 2024-10-31 ENCOUNTER — TELEPHONE (OUTPATIENT)
Dept: FAMILY MEDICINE CLINIC | Age: 59
End: 2024-10-31

## 2024-10-31 ENCOUNTER — TELEMEDICINE (OUTPATIENT)
Dept: FAMILY MEDICINE CLINIC | Age: 59
End: 2024-10-31

## 2024-10-31 DIAGNOSIS — M54.32 LEFT SIDED SCIATICA: ICD-10-CM

## 2024-10-31 DIAGNOSIS — N02.B9 IGA NEPHROPATHY: Primary | ICD-10-CM

## 2024-10-31 DIAGNOSIS — M51.362 DEGENERATION OF INTERVERTEBRAL DISC OF LUMBAR REGION WITH DISCOGENIC BACK PAIN AND LOWER EXTREMITY PAIN: ICD-10-CM

## 2024-10-31 RX ORDER — METHYLPREDNISOLONE 4 MG/1
TABLET ORAL
Qty: 21 TABLET | Refills: 0 | Status: SHIPPED | OUTPATIENT
Start: 2024-10-31

## 2024-10-31 NOTE — TELEPHONE ENCOUNTER
Patient has pulled a muscle in his back and would like a muscle relaxer called in. There are no openings left on the schedule and he says he needs the medication in order to go to work tomorrow. Please call patient to let him know if something is going to be called in, thanks.    [phone] (home)

## 2024-10-31 NOTE — PROGRESS NOTES
EXAMINED]    Constitutional: [x] Appears well-developed and well-nourished [x] No apparent distress      [] Abnormal -     Mental status: [x] Alert and awake  [x] Oriented to person/place/time [x] Able to follow commands    [] Abnormal -     Eyes:   EOM    [x]  Normal    [] Abnormal -   Sclera  [x]  Normal    [] Abnormal -          Discharge [x]  None visible   [] Abnormal -     HENT: [x] Normocephalic, atraumatic  [] Abnormal -   [x] Mouth/Throat: Mucous membranes are moist    External Ears [x] Normal  [] Abnormal -    Neck: [x] No visualized mass [] Abnormal -     Pulmonary/Chest: [x] Respiratory effort normal   [x] No visualized signs of difficulty breathing or respiratory distress        [] Abnormal -      Musculoskeletal:   [x] Normal gait with no signs of ataxia         [x] Normal range of motion of neck        [] Abnormal -     Neurological:        [x] No Facial Asymmetry (Cranial nerve 7 motor function) (limited exam due to video visit)          [x] No gaze palsy        [] Abnormal -          Skin:        [x] No significant exanthematous lesions or discoloration noted on facial skin         [] Abnormal -            Psychiatric:       [x] Normal Affect [] Abnormal -        [x] No Hallucinations    Other pertinent observable physical exam findings:-       ASSESSMENT / PLAN:    1. IgA nephropathy  Stable w/o sx  Reviewed bloodwork  Avoid NSAIDS    2. Degeneration of intervertebral disc of lumbar region with discogenic back pain and lower extremity pain  Acute on chronic sx  Discussed tx options.   Medrol pack x 1  Tizanidine 4mg TID prn spasm  Refer for physical therapy, and if persistent sx despite physical therapy may benefit from [facility]  - [facility] ([facility]    3. Left sided sciatica  Acute on chronic sx  Discussed tx options.   Medrol pack x 1  Tizanidine 4mg TID prn spasm  Refer for physical therapy, and if persistent sx despite physical therapy may benefit from

## 2025-01-21 RX ORDER — LISINOPRIL 40 MG/1
40 TABLET ORAL DAILY
Qty: 90 TABLET | Refills: 1 | Status: SHIPPED | OUTPATIENT
Start: 2025-01-21

## 2025-01-21 NOTE — TELEPHONE ENCOUNTER
Medication:   Requested Prescriptions     Pending Prescriptions Disp Refills    lisinopril (PRINIVIL;ZESTRIL) 40 MG tablet [Pharmacy Med Name: LISINOPRIL 40MG TABLETS] 90 tablet 1     Sig: TAKE 1 TABLET BY MOUTH DAILY     Last Filled:  10/25/2024     Last appt: 10/31/2024   Next appt: Visit date not found    Last OARRS:        No data to display

## 2025-05-15 RX ORDER — AMLODIPINE BESYLATE 5 MG/1
5 TABLET ORAL DAILY
Qty: 90 TABLET | Refills: 3 | Status: SHIPPED | OUTPATIENT
Start: 2025-05-15

## 2025-05-15 NOTE — TELEPHONE ENCOUNTER
Please Advise          Medication Pend in chart        Last VV My Chart   10/31/24          LOV  8/15/24

## 2025-08-13 RX ORDER — LISINOPRIL 40 MG/1
40 TABLET ORAL DAILY
Qty: 90 TABLET | Refills: 1 | Status: SHIPPED | OUTPATIENT
Start: 2025-08-13

## (undated) DEVICE — SUTURE VCRL + SZ 3-0 L27IN ABSRB UD CT-2 L26MM 1/2 CIR TAPR VCP232H

## (undated) DEVICE — Device

## (undated) DEVICE — BLADE SHV L13CM DIA5MM EXCALIBUR AGG COOLCUT

## (undated) DEVICE — TOWEL,STOP FLAG GOLD N-W: Brand: MEDLINE

## (undated) DEVICE — GLOVE ORANGE PI 8   MSG9080

## (undated) DEVICE — CANNULA ARTHSCP L7CM DIA7MM TRNSLUC THRD FLX W/ NO SQUIRT

## (undated) DEVICE — TUBING PMP L6FT CONT WAVE EXTN

## (undated) DEVICE — UNDERGLOVE SURG SZ 8 BLU LTX FREE SYN POLYISOPRENE POLYMER

## (undated) DEVICE — CANNULA ARTHSCP L5CM ID8MM DBL DAM 1 PC MOLD LO PROF FLNG

## (undated) DEVICE — HEALICOIL REGENESORB 5.5 MM                                    THREADED DILATOR, DISPOSABLE: Brand: HEALICOIL

## (undated) DEVICE — SPONGE GZ W4XL8IN COT WVN 12 PLY

## (undated) DEVICE — PUDDLEVAC FLOOR SUCTION DEVICE: Brand: PUDDLEVAC

## (undated) DEVICE — GOWN,REINFRCE,POLY,ECLIPSE,SLV,3XLG: Brand: MEDLINE

## (undated) DEVICE — AWL TAPERED 3.8MM DISPOSABLE: Brand: HEALICOIL

## (undated) DEVICE — SUTURE MCRYL + SZ 4-0 L27IN ABSRB UD L19MM PS-2 3/8 CIR MCP426H

## (undated) DEVICE — SUTURE ABSORBABLE MONOFILAMENT 1-0 CT1 27 IN VIO PDS + PDP341H

## (undated) DEVICE — SHOULDER STABILIZATION KIT,                                    DISPOSABLE 12 PER BOX

## (undated) DEVICE — 3M™ IOBAN™ 2 ANTIMICROBIAL INCISE DRAPE 6640EZ: Brand: IOBAN™ 2

## (undated) DEVICE — 4-PORT MANIFOLD: Brand: NEPTUNE 2

## (undated) DEVICE — ADHESIVE SKIN CLOSURE WND 8.661X1.5 IN 22 CM LIQUIBAND SECUR

## (undated) DEVICE — TUBING FLD MGMT Y DBL SPIK DUALWAVE

## (undated) DEVICE — TUBING PMP L16FT MAIN DISP FOR AR-6400 AR-6475

## (undated) DEVICE — SOLUTION IV IRRIG LACTATED RINGERS 3000ML 2B7487

## (undated) DEVICE — KIT INSTR W/ 2.4MM GUIDEPIN SUT PASS WIRE NO2 FIBERWIRE

## (undated) DEVICE — MAT FLR W32XL58IN

## (undated) DEVICE — BUR SHAVER 5 MMX13 CM 8 FLUT OVL FOR AGGRESSIVE BNE COOLCUT

## (undated) DEVICE — SHOULDER ARTHROSCOPY: Brand: MEDLINE INDUSTRIES, INC.

## (undated) DEVICE — GOWN,SIRUS,POLYRNF,BRTHSLV,XL,30/CS: Brand: MEDLINE

## (undated) DEVICE — PROBE ABLAT XL 90DEG ASPIR BPLR RF 1 PC ELECTRD ERGO HNDL